# Patient Record
Sex: FEMALE | Race: WHITE | URBAN - NONMETROPOLITAN AREA
[De-identification: names, ages, dates, MRNs, and addresses within clinical notes are randomized per-mention and may not be internally consistent; named-entity substitution may affect disease eponyms.]

---

## 2017-01-05 RX ORDER — CLONAZEPAM 1 MG/1
1 TABLET ORAL 2 TIMES DAILY
Qty: 30 TABLET | Refills: 0 | Status: SHIPPED
Start: 2017-01-05 | End: 2017-01-24

## 2017-01-05 NOTE — TELEPHONE ENCOUNTER
Last OV: 6/1/2016  Last filled: 7/12/2016 #60 no RF    Pt would like refill request to go to covering provider

## 2017-01-05 NOTE — TELEPHONE ENCOUNTER
15 days supply   Re establish in office with Dr Tracee Fregoso to refill  Scrip printed and available  Please fax or call this script  Thanks! !  --jg

## 2017-01-05 NOTE — TELEPHONE ENCOUNTER
Clonazepam faxed to pharmacy. Sertraline filled for 1 year in 7/2016. Patient advised of this. Appointment made with Dr. Valeriano Garces on 1/27/17 at 1 pm.  Booked for 30 minute appointment. Routing to Dr. Valeriano Garces.  Please advise if this is an acceptable length f

## 2017-01-24 RX ORDER — CLONAZEPAM 1 MG/1
TABLET ORAL
Qty: 30 TABLET | Refills: 0 | OUTPATIENT
Start: 2017-01-24

## 2017-01-24 RX ORDER — CLONAZEPAM 1 MG/1
1 TABLET ORAL 2 TIMES DAILY
Qty: 30 TABLET | Refills: 0 | Status: SHIPPED
Start: 2017-01-24 | End: 2017-05-03

## 2017-01-24 NOTE — TELEPHONE ENCOUNTER
Last office visit 6-1-16  Last refill 1-5-17 #30  Future Appointments  Date Time Provider Saray Roque   1/25/2017 9:45 AM Daylin Garvey DO [facility]

## 2017-02-10 PROCEDURE — 84443 ASSAY THYROID STIM HORMONE: CPT | Performed by: FAMILY MEDICINE

## 2017-02-10 PROCEDURE — 85025 COMPLETE CBC W/AUTO DIFF WBC: CPT | Performed by: FAMILY MEDICINE

## 2017-02-10 PROCEDURE — 82607 VITAMIN B-12: CPT | Performed by: FAMILY MEDICINE

## 2017-02-10 PROCEDURE — 80053 COMPREHEN METABOLIC PANEL: CPT | Performed by: FAMILY MEDICINE

## 2017-02-10 PROCEDURE — 82306 VITAMIN D 25 HYDROXY: CPT | Performed by: FAMILY MEDICINE

## 2017-02-10 NOTE — PROGRESS NOTES
Lowell Flood is a 43year old female. HPI:   Daughter lives with boyfriend she goes to school as well. Got out of long term 1/4/2017 criminal property damage. Has lost 50 lb. Did not have any meds while in long term. Anxiety is off the wall.   Asks to go back on zo telengectasia to chest noted.   HEENT: ears and throat are clear  NECK: supple,no adenopathy  LUNGS: clear to auscultation  CARDIO: RRR without murmur  GI: good BS's,no masses, HSM or tenderness  EXTREMITIES: no cyanosis, clubbing or edema    ASSESSMENT AND

## 2017-02-10 NOTE — PATIENT INSTRUCTIONS
Recovering from Addiction: Continuing with Counseling  The road to recovery can be tough. But working with a counselor can help make your recovery smoother and keep you on track.  A counselor can help you decide which lifestyle changes you want to make to · [employer] on Alcoholism and Drug [address] [place] [address] [phone]  · National Drug and Alcohol Treatment Referral Service  162-345-LVZD ([phone])   Date Last Reviewed: 11/12/2014  © 9185-0381 The [address] [place] [address] Jhon Vuong © 8741-8849 [address], [address]. All rights reserved. This information is not intended as a substitute for professional medical care. Always follow your healthcare professional's instructions.         Your [employer] Anxiety can become a problem when it is difficult to control, occurs for months, and interferes with important parts of your life. With an anxiety disorder, your body has the response described above, but in inappropriate ways.  The response a person has de Clonazepam 1 mg bid  Labs done  Discussed addiction and pitfalls  Return for physical  congratulated on weight loss

## 2017-03-10 RX ORDER — CLONAZEPAM 1 MG/1
TABLET ORAL
Qty: 60 TABLET | Refills: 0 | OUTPATIENT
Start: 2017-03-10

## 2017-05-04 RX ORDER — CLONAZEPAM 1 MG/1
TABLET ORAL
Qty: 60 TABLET | Refills: 0 | OUTPATIENT
Start: 2017-05-04

## 2017-06-07 ENCOUNTER — PATIENT OUTREACH (OUTPATIENT)
Dept: FAMILY MEDICINE CLINIC | Facility: CLINIC | Age: 37
End: 2017-06-07

## 2017-06-29 DIAGNOSIS — F32.A ANXIETY AND DEPRESSION: ICD-10-CM

## 2017-06-29 DIAGNOSIS — F41.9 ANXIETY AND DEPRESSION: ICD-10-CM

## 2017-06-30 RX ORDER — CLONAZEPAM 1 MG/1
1 TABLET ORAL 2 TIMES DAILY PRN
Qty: 60 TABLET | Refills: 0 | Status: SHIPPED | OUTPATIENT
Start: 2017-06-30 | End: 2017-07-28

## 2017-07-28 DIAGNOSIS — F32.A ANXIETY AND DEPRESSION: ICD-10-CM

## 2017-07-28 DIAGNOSIS — F41.9 ANXIETY AND DEPRESSION: ICD-10-CM

## 2017-07-28 RX ORDER — CLONAZEPAM 1 MG/1
1 TABLET ORAL 2 TIMES DAILY PRN
Qty: 60 TABLET | Refills: 0 | Status: SHIPPED | OUTPATIENT
Start: 2017-07-28 | End: 2017-08-28

## 2017-08-22 ENCOUNTER — TELEPHONE (OUTPATIENT)
Dept: FAMILY MEDICINE CLINIC | Facility: CLINIC | Age: 37
End: 2017-08-22

## 2017-08-28 DIAGNOSIS — F32.A ANXIETY AND DEPRESSION: ICD-10-CM

## 2017-08-28 DIAGNOSIS — F41.9 ANXIETY AND DEPRESSION: ICD-10-CM

## 2017-08-29 ENCOUNTER — TELEPHONE (OUTPATIENT)
Dept: FAMILY MEDICINE CLINIC | Facility: CLINIC | Age: 37
End: 2017-08-29

## 2017-08-29 RX ORDER — CLONAZEPAM 1 MG/1
1 TABLET ORAL 2 TIMES DAILY PRN
Qty: 60 TABLET | Refills: 0 | Status: SHIPPED
Start: 2017-08-29 | End: 2017-09-26

## 2017-08-29 NOTE — TELEPHONE ENCOUNTER
SCRIPT FOR THIS PATIENT'S CLONAZEPAM WAS ALREADY SENT TO [facility] IN [place] BUT THEY ARE OUT OF STOCK AND STACY NEEDS VERBAL OK

## 2017-09-26 DIAGNOSIS — F41.9 ANXIETY AND DEPRESSION: ICD-10-CM

## 2017-09-26 DIAGNOSIS — F32.A ANXIETY AND DEPRESSION: ICD-10-CM

## 2017-09-26 RX ORDER — CLONAZEPAM 1 MG/1
1 TABLET ORAL 2 TIMES DAILY PRN
Qty: 60 TABLET | Refills: 0 | Status: SHIPPED | OUTPATIENT
Start: 2017-09-26 | End: 2017-10-25

## 2017-10-25 DIAGNOSIS — F32.A ANXIETY AND DEPRESSION: ICD-10-CM

## 2017-10-25 DIAGNOSIS — F41.9 ANXIETY AND DEPRESSION: ICD-10-CM

## 2017-10-25 RX ORDER — CLONAZEPAM 1 MG/1
TABLET ORAL
Qty: 60 TABLET | Refills: 0 | Status: SHIPPED
Start: 2017-10-25 | End: 2017-11-30

## 2017-10-25 RX ORDER — CLONAZEPAM 1 MG/1
1 TABLET ORAL 2 TIMES DAILY PRN
Qty: 60 TABLET | Refills: 0 | Status: CANCELLED | OUTPATIENT
Start: 2017-10-25

## 2017-10-25 RX ORDER — CLONAZEPAM 1 MG/1
TABLET ORAL
Qty: 60 TABLET | Refills: 0 | Status: CANCELLED | OUTPATIENT
Start: 2017-10-25

## 2017-10-25 RX ORDER — SERTRALINE HYDROCHLORIDE 100 MG/1
TABLET, FILM COATED ORAL
Qty: 30 TABLET | Refills: 0 | Status: SHIPPED | OUTPATIENT
Start: 2017-10-25 | End: 2017-12-06

## 2017-11-30 DIAGNOSIS — F41.9 ANXIETY AND DEPRESSION: ICD-10-CM

## 2017-11-30 DIAGNOSIS — F32.A ANXIETY AND DEPRESSION: ICD-10-CM

## 2017-11-30 RX ORDER — CLONAZEPAM 1 MG/1
TABLET ORAL
Qty: 10 TABLET | Refills: 0 | Status: SHIPPED | OUTPATIENT
Start: 2017-11-30 | End: 2017-12-06

## 2017-11-30 NOTE — TELEPHONE ENCOUNTER
Patient returned phone call and notified. appt booked.   Future Appointments  Date Time Provider Saray Roque   12/6/2017 2:30 PM Red Garvey DO [facility].

## 2017-11-30 NOTE — TELEPHONE ENCOUNTER
LOV- 2/10/17 - per that [address] patient was suppose to follow up in 1 month  Last refill- 10/25/2017 #60 with no refills  Patient states she is completely out of medication and asking for another provider to address.   Sent to covering provider, medication pendi

## 2017-11-30 NOTE — TELEPHONE ENCOUNTER
Left message on answering machine for patient to return phone call. Will need to notify that patient 10 pills were given, rx faxed to pharmacy.

## 2017-11-30 NOTE — TELEPHONE ENCOUNTER
REFILL CLONAZEPAM TO [facility], TOOK LAST PILL TODAY, CAN ANOTHER DR AUTHORIZE SINCE ENRRIQUE IS OUT OF OFFICE?

## 2017-12-06 ENCOUNTER — OFFICE VISIT (OUTPATIENT)
Dept: FAMILY MEDICINE CLINIC | Facility: CLINIC | Age: 37
End: 2017-12-06

## 2017-12-06 VITALS
HEIGHT: 65 IN | WEIGHT: 148 LBS | OXYGEN SATURATION: 99 % | DIASTOLIC BLOOD PRESSURE: 80 MMHG | TEMPERATURE: 99 F | HEART RATE: 78 BPM | SYSTOLIC BLOOD PRESSURE: 120 MMHG | BODY MASS INDEX: 24.66 KG/M2

## 2017-12-06 DIAGNOSIS — F10.21 ALCOHOLISM IN REMISSION (HCC): ICD-10-CM

## 2017-12-06 DIAGNOSIS — F32.A ANXIETY AND DEPRESSION: ICD-10-CM

## 2017-12-06 DIAGNOSIS — Z79.899 ENCOUNTER FOR DRUG THERAPY: Primary | ICD-10-CM

## 2017-12-06 DIAGNOSIS — F41.9 ANXIETY AND DEPRESSION: ICD-10-CM

## 2017-12-06 PROCEDURE — 99214 OFFICE O/P EST MOD 30 MIN: CPT | Performed by: FAMILY MEDICINE

## 2017-12-06 RX ORDER — CLONAZEPAM 1 MG/1
TABLET ORAL
Qty: 60 TABLET | Refills: 0 | Status: SHIPPED | OUTPATIENT
Start: 2017-12-06 | End: 2017-12-29

## 2017-12-06 RX ORDER — SERTRALINE HYDROCHLORIDE 100 MG/1
100 TABLET, FILM COATED ORAL
Qty: 90 TABLET | Refills: 1 | Status: SHIPPED | OUTPATIENT
Start: 2017-12-06 | End: 2018-06-06

## 2017-12-06 RX ORDER — QUETIAPINE 50 MG/1
50 TABLET, FILM COATED ORAL NIGHTLY
Qty: 30 TABLET | Refills: 0 | Status: SHIPPED | OUTPATIENT
Start: 2017-12-06 | End: 2018-06-06

## 2017-12-06 NOTE — PATIENT INSTRUCTIONS
Continue zoloft 100 mg - refilled for 6 months  Continue klonipin 1 mg bid refilled #30   Add seroquel 50mg  Watch for swelling of legs  Watch for excess voiding   Return 1 month for chem and CBC.  To monitor medication

## 2017-12-06 NOTE — PROGRESS NOTES
Neela Small is a 43year old female. HPI:   Here to discuss anxiety. Sleep is a struggle. Feels zoloft 100 mg is helping with anxiety and depression. Will occ have a beer. Has not gone to AA. Is  .  Working on where she will live with her Encounter for drug therapy  (primary encounter diagnosis)  Anxiety and depression  Alcoholism in remission (hcc)    No orders of the defined types were placed in this encounter.       Meds & Refills for this Visit:  Signed Prescriptions Disp Refills    QU

## 2017-12-29 DIAGNOSIS — Z79.899 ENCOUNTER FOR DRUG THERAPY: ICD-10-CM

## 2017-12-29 DIAGNOSIS — F32.A ANXIETY AND DEPRESSION: ICD-10-CM

## 2017-12-29 DIAGNOSIS — F41.9 ANXIETY AND DEPRESSION: ICD-10-CM

## 2017-12-29 RX ORDER — CLONAZEPAM 1 MG/1
TABLET ORAL
Qty: 60 TABLET | Refills: 0 | Status: SHIPPED | OUTPATIENT
Start: 2017-12-29 | End: 2018-01-25

## 2018-01-25 ENCOUNTER — TELEPHONE (OUTPATIENT)
Dept: FAMILY MEDICINE CLINIC | Facility: CLINIC | Age: 38
End: 2018-01-25

## 2018-01-25 DIAGNOSIS — F41.9 ANXIETY AND DEPRESSION: ICD-10-CM

## 2018-01-25 DIAGNOSIS — Z79.899 ENCOUNTER FOR DRUG THERAPY: ICD-10-CM

## 2018-01-25 DIAGNOSIS — F32.A ANXIETY AND DEPRESSION: ICD-10-CM

## 2018-01-25 RX ORDER — CLONAZEPAM 1 MG/1
TABLET ORAL
Qty: 60 TABLET | Refills: 0 | Status: CANCELLED | OUTPATIENT
Start: 2018-01-25

## 2018-01-26 RX ORDER — CLONAZEPAM 1 MG/1
TABLET ORAL
Qty: 60 TABLET | Refills: 0 | Status: SHIPPED | OUTPATIENT
Start: 2018-01-26 | End: 2018-02-28

## 2018-01-31 ENCOUNTER — TELEPHONE (OUTPATIENT)
Dept: FAMILY MEDICINE CLINIC | Facility: CLINIC | Age: 38
End: 2018-01-31

## 2018-01-31 NOTE — TELEPHONE ENCOUNTER
Patient states that she has been moving from house to house. She has lived in 4 houses. Is unable to remember where she left the bottle. Advised that medication is a controlled substance which is only able to be filled once per month.   Patient notified

## 2018-02-28 ENCOUNTER — TELEPHONE (OUTPATIENT)
Dept: FAMILY MEDICINE CLINIC | Facility: CLINIC | Age: 38
End: 2018-02-28

## 2018-02-28 DIAGNOSIS — F32.A ANXIETY AND DEPRESSION: ICD-10-CM

## 2018-02-28 DIAGNOSIS — Z79.899 ENCOUNTER FOR DRUG THERAPY: ICD-10-CM

## 2018-02-28 DIAGNOSIS — F41.9 ANXIETY AND DEPRESSION: ICD-10-CM

## 2018-02-28 RX ORDER — CLONAZEPAM 1 MG/1
TABLET ORAL
Qty: 60 TABLET | Refills: 0 | Status: SHIPPED | OUTPATIENT
Start: 2018-02-28 | End: 2018-03-29

## 2018-03-29 DIAGNOSIS — Z79.899 ENCOUNTER FOR DRUG THERAPY: ICD-10-CM

## 2018-03-29 DIAGNOSIS — F41.9 ANXIETY AND DEPRESSION: ICD-10-CM

## 2018-03-29 DIAGNOSIS — F32.A ANXIETY AND DEPRESSION: ICD-10-CM

## 2018-03-30 RX ORDER — CLONAZEPAM 1 MG/1
TABLET ORAL
Qty: 60 TABLET | Refills: 0 | Status: SHIPPED
Start: 2018-03-30 | End: 2018-05-01

## 2018-04-26 ENCOUNTER — TELEPHONE (OUTPATIENT)
Dept: FAMILY MEDICINE CLINIC | Facility: CLINIC | Age: 38
End: 2018-04-26

## 2018-04-26 NOTE — TELEPHONE ENCOUNTER
Last refilled on 3/30/18 for # 60 with 0 rf. Last seen on 12/6/17. No future appointments. Thank you.

## 2018-04-30 DIAGNOSIS — F41.9 ANXIETY AND DEPRESSION: ICD-10-CM

## 2018-04-30 DIAGNOSIS — Z79.899 ENCOUNTER FOR DRUG THERAPY: ICD-10-CM

## 2018-04-30 DIAGNOSIS — F32.A ANXIETY AND DEPRESSION: ICD-10-CM

## 2018-04-30 RX ORDER — CLONAZEPAM 1 MG/1
TABLET ORAL
Qty: 60 TABLET | Refills: 0 | Status: CANCELLED | OUTPATIENT
Start: 2018-04-30

## 2018-05-01 ENCOUNTER — TELEPHONE (OUTPATIENT)
Dept: FAMILY MEDICINE CLINIC | Facility: CLINIC | Age: 38
End: 2018-05-01

## 2018-05-01 DIAGNOSIS — F41.9 ANXIETY AND DEPRESSION: ICD-10-CM

## 2018-05-01 DIAGNOSIS — Z79.899 ENCOUNTER FOR DRUG THERAPY: ICD-10-CM

## 2018-05-01 DIAGNOSIS — F32.A ANXIETY AND DEPRESSION: ICD-10-CM

## 2018-05-01 RX ORDER — CLONAZEPAM 1 MG/1
TABLET ORAL
Qty: 60 TABLET | Refills: 0 | Status: SHIPPED | OUTPATIENT
Start: 2018-05-01 | End: 2018-06-06

## 2018-05-01 NOTE — TELEPHONE ENCOUNTER
CLONAZEPAM   [facility]      SHE SAID THAT SHE ASKED FOR THIS REFILL LAST WEEK AND IT HAS NOT BEEN REFILLED YET

## 2018-05-01 NOTE — TELEPHONE ENCOUNTER
Alana Champion needs to follow up next month for 6 month check.  She will get her next refill with her appointment

## 2018-05-23 ENCOUNTER — TELEPHONE (OUTPATIENT)
Dept: FAMILY MEDICINE CLINIC | Facility: CLINIC | Age: 38
End: 2018-05-23

## 2018-05-31 DIAGNOSIS — Z79.899 ENCOUNTER FOR DRUG THERAPY: ICD-10-CM

## 2018-05-31 DIAGNOSIS — F41.9 ANXIETY AND DEPRESSION: ICD-10-CM

## 2018-05-31 DIAGNOSIS — F32.A ANXIETY AND DEPRESSION: ICD-10-CM

## 2018-05-31 NOTE — TELEPHONE ENCOUNTER
Last office visit:  12/06/2017    Last refill:  5/01/2018    Pending Prescriptions Disp Refills    CLONAZEPAM 1 MG Oral Tab [Pharmacy Med Name: CLONAZEPAM 1MG TABLETS] 60 tablet 0     Sig: TAKE 1 TABLET BY MOUTH TWICE DAILY AS NEEDED FOR ANXIETY         No future appointments.

## 2018-06-01 RX ORDER — CLONAZEPAM 1 MG/1
TABLET ORAL
Qty: 60 TABLET | Refills: 0 | OUTPATIENT
Start: 2018-06-01

## 2018-06-06 ENCOUNTER — OFFICE VISIT (OUTPATIENT)
Dept: FAMILY MEDICINE CLINIC | Facility: CLINIC | Age: 38
End: 2018-06-06

## 2018-06-06 VITALS
WEIGHT: 153 LBS | TEMPERATURE: 98 F | SYSTOLIC BLOOD PRESSURE: 118 MMHG | HEIGHT: 64 IN | HEART RATE: 88 BPM | DIASTOLIC BLOOD PRESSURE: 80 MMHG | BODY MASS INDEX: 26.12 KG/M2 | RESPIRATION RATE: 12 BRPM

## 2018-06-06 DIAGNOSIS — Z79.899 ENCOUNTER FOR DRUG THERAPY: ICD-10-CM

## 2018-06-06 DIAGNOSIS — J30.89 SEASONAL ALLERGIC RHINITIS DUE TO OTHER ALLERGIC TRIGGER: ICD-10-CM

## 2018-06-06 DIAGNOSIS — Z51.81 ENCOUNTER FOR THERAPEUTIC DRUG MONITORING: Primary | ICD-10-CM

## 2018-06-06 DIAGNOSIS — F41.9 ANXIETY AND DEPRESSION: ICD-10-CM

## 2018-06-06 DIAGNOSIS — L23.7 POISON IVY DERMATITIS: ICD-10-CM

## 2018-06-06 DIAGNOSIS — F32.A ANXIETY AND DEPRESSION: ICD-10-CM

## 2018-06-06 PROBLEM — J30.9 ALLERGIC RHINITIS DUE TO ALLERGEN: Status: ACTIVE | Noted: 2018-06-06

## 2018-06-06 PROCEDURE — 99212 OFFICE O/P EST SF 10 MIN: CPT | Performed by: FAMILY MEDICINE

## 2018-06-06 RX ORDER — QUETIAPINE 25 MG/1
25 TABLET, FILM COATED ORAL NIGHTLY
Qty: 30 TABLET | Refills: 0 | Status: SHIPPED | OUTPATIENT
Start: 2018-06-06 | End: 2018-08-10

## 2018-06-06 RX ORDER — SERTRALINE HYDROCHLORIDE 100 MG/1
100 TABLET, FILM COATED ORAL
Qty: 90 TABLET | Refills: 1 | Status: SHIPPED | OUTPATIENT
Start: 2018-06-06 | End: 2019-02-22

## 2018-06-06 RX ORDER — PREDNISONE 20 MG/1
20 TABLET ORAL 2 TIMES DAILY
Qty: 10 TABLET | Refills: 0 | Status: SHIPPED | OUTPATIENT
Start: 2018-06-06 | End: 2018-06-11

## 2018-06-06 RX ORDER — CLONAZEPAM 1 MG/1
TABLET ORAL
Qty: 60 TABLET | Refills: 0 | Status: SHIPPED | OUTPATIENT
Start: 2018-06-06 | End: 2018-06-27

## 2018-06-06 NOTE — PROGRESS NOTES
Gerry Acosta is a 42year old female. HPI:   Here for med refill.  her . Anxiety is high. DAUGHTER GETTING  IN 2 WEEKS. SHE IS NOT WORKING. DRINKS SOME. FRIEND GAVE HER XANAX TO GET HER THROUGH UNTIL THIS APPT.  NO INSURANCE    Curre AND PLAN:   1. Encounter for therapeutic drug monitoring  - stressed importance of follow up every 6 months for medication monitoring    2.  Anxiety and depression  - zoloft 100 mg  - seroquel 25 mg nightly  - klonazepam 1 mg bid prn will refill for 6 month

## 2018-06-06 NOTE — PATIENT INSTRUCTIONS
Treating Anxiety Disorders with Medicine  An anxiety disorder can make you feel nervous or apprehensive, even without a clear reason.  In people age 72 and older, generalized anxiety disorder is one of the most commonly diagnosed anxiety disorders. Many t Never use alcohol or other drugs with anti-anxiety medicines. This could result in loss of muscular control, sedation, coma, or death. Also, use only the amount of medicine prescribed for you.  If you think you may have taken too much, get emergency care ri · Sexual problems. Some antidepressants can affect your desire for sex or your ability to have an orgasm. A change in dosage or medicine often solves the problem. If you have a sexual side effect that concerns you, tell your healthcare provider.   · Addicti

## 2018-06-27 ENCOUNTER — TELEPHONE (OUTPATIENT)
Dept: FAMILY MEDICINE CLINIC | Facility: CLINIC | Age: 38
End: 2018-06-27

## 2018-06-27 DIAGNOSIS — F41.9 ANXIETY AND DEPRESSION: ICD-10-CM

## 2018-06-27 DIAGNOSIS — Z79.899 ENCOUNTER FOR DRUG THERAPY: ICD-10-CM

## 2018-06-27 DIAGNOSIS — F32.A ANXIETY AND DEPRESSION: ICD-10-CM

## 2018-06-27 RX ORDER — CLONAZEPAM 1 MG/1
TABLET ORAL
Qty: 60 TABLET | Refills: 0 | Status: SHIPPED
Start: 2018-06-27 | End: 2018-06-27 | Stop reason: CLARIF

## 2018-06-27 NOTE — TELEPHONE ENCOUNTER
Detailed message left for patient notifying him that Clonzepam refill is denied. Medication should last for one month per Dr. Arpit [place]. Patient should have enough until 7/6/18.

## 2018-07-05 DIAGNOSIS — F32.A ANXIETY AND DEPRESSION: ICD-10-CM

## 2018-07-05 DIAGNOSIS — Z79.899 ENCOUNTER FOR DRUG THERAPY: ICD-10-CM

## 2018-07-05 DIAGNOSIS — F41.9 ANXIETY AND DEPRESSION: ICD-10-CM

## 2018-07-05 RX ORDER — CLONAZEPAM 1 MG/1
TABLET ORAL
Qty: 60 TABLET | Refills: 0 | Status: SHIPPED
Start: 2018-07-05 | End: 2018-08-01

## 2018-07-05 NOTE — TELEPHONE ENCOUNTER
Per office note 6/27, should last one month. Should have enough until 7/6/18. Patient states she is out. OK to fill today?

## 2018-07-05 NOTE — TELEPHONE ENCOUNTER
Ov 6/6/2018  Clonazepam last filled 6/6/2018    Vitamin D was due 5/8/2017? ?   Vitamin B12 was due 8/13/2017  Please advise

## 2018-07-05 NOTE — TELEPHONE ENCOUNTER
Pt. Bernardo Gaby to go get her CLONAZEPAM 1 MG Oral Tab   And it was not ready? She said she needs it today.

## 2018-07-13 ENCOUNTER — TELEPHONE (OUTPATIENT)
Dept: FAMILY MEDICINE CLINIC | Facility: CLINIC | Age: 38
End: 2018-07-13

## 2018-07-13 DIAGNOSIS — Z79.899 ENCOUNTER FOR DRUG THERAPY: ICD-10-CM

## 2018-07-13 DIAGNOSIS — F41.9 ANXIETY AND DEPRESSION: ICD-10-CM

## 2018-07-13 DIAGNOSIS — F32.A ANXIETY AND DEPRESSION: ICD-10-CM

## 2018-07-13 RX ORDER — PREDNISONE 20 MG/1
20 TABLET ORAL 2 TIMES DAILY
Qty: 10 TABLET | Refills: 0 | Status: SHIPPED | OUTPATIENT
Start: 2018-07-13 | End: 2018-07-18

## 2018-07-13 RX ORDER — TRIAMCINOLONE ACETONIDE 5 MG/G
CREAM TOPICAL
Qty: 60 G | Refills: 0 | Status: SHIPPED | OUTPATIENT
Start: 2018-07-13 | End: 2019-02-22

## 2018-07-13 NOTE — TELEPHONE ENCOUNTER
Patient states that she has poison ivy on her arm, neck and face. Per aidan Christensen to send in script for prednisone 20mg BID x 5 days and triamcinalone cream BID. If not better, patient should follow up. Patient verbalized understanding.

## 2018-08-01 DIAGNOSIS — Z79.899 ENCOUNTER FOR DRUG THERAPY: ICD-10-CM

## 2018-08-01 DIAGNOSIS — F41.9 ANXIETY AND DEPRESSION: ICD-10-CM

## 2018-08-01 DIAGNOSIS — F32.A ANXIETY AND DEPRESSION: ICD-10-CM

## 2018-08-01 RX ORDER — CLONAZEPAM 1 MG/1
TABLET ORAL
Qty: 60 TABLET | Refills: 0 | Status: SHIPPED
Start: 2018-08-01 | End: 2018-08-28

## 2018-08-02 ENCOUNTER — TELEPHONE (OUTPATIENT)
Dept: FAMILY MEDICINE CLINIC | Facility: CLINIC | Age: 38
End: 2018-08-02

## 2018-08-02 NOTE — TELEPHONE ENCOUNTER
CLONAZEPAM TO [facility] [address] [facility] IN [place]. CANNOT GET TO [facility] IN [place] TODAY.

## 2018-08-02 NOTE — TELEPHONE ENCOUNTER
Phone call to [employer] [facility]. Rx canceled with [facility].   Rx faxed to [facility] [address] per patient's request.

## 2018-08-10 DIAGNOSIS — F41.9 ANXIETY AND DEPRESSION: ICD-10-CM

## 2018-08-10 DIAGNOSIS — Z51.81 ENCOUNTER FOR THERAPEUTIC DRUG MONITORING: ICD-10-CM

## 2018-08-10 DIAGNOSIS — F32.A ANXIETY AND DEPRESSION: ICD-10-CM

## 2018-08-10 RX ORDER — QUETIAPINE 25 MG/1
25 TABLET, FILM COATED ORAL NIGHTLY
Qty: 30 TABLET | Refills: 0 | Status: SHIPPED | OUTPATIENT
Start: 2018-08-10 | End: 2018-09-08

## 2018-08-10 NOTE — TELEPHONE ENCOUNTER
REFILL SERAQUIL  TO [facility] [address] [facility] [address] IN [place]    IS OUT OF MEDICATION.

## 2018-08-11 ENCOUNTER — TELEPHONE (OUTPATIENT)
Dept: FAMILY MEDICINE CLINIC | Facility: CLINIC | Age: 38
End: 2018-08-11

## 2018-08-11 RX ORDER — QUETIAPINE 25 MG/1
TABLET, FILM COATED ORAL
Qty: 30 TABLET | Refills: 0 | OUTPATIENT
Start: 2018-08-11

## 2018-08-11 NOTE — TELEPHONE ENCOUNTER
Medication sent to [facility] in [facility]. I called the [facility] in [place] and spoke to Roseanne Morris and she will fill the medication.    I called the pt and NA and voicemail is not set up. Judy Tee

## 2018-08-28 DIAGNOSIS — F32.A ANXIETY AND DEPRESSION: ICD-10-CM

## 2018-08-28 DIAGNOSIS — Z79.899 ENCOUNTER FOR DRUG THERAPY: ICD-10-CM

## 2018-08-28 DIAGNOSIS — F41.9 ANXIETY AND DEPRESSION: ICD-10-CM

## 2018-08-28 RX ORDER — CLONAZEPAM 1 MG/1
TABLET ORAL
Qty: 60 TABLET | Refills: 0 | Status: SHIPPED | OUTPATIENT
Start: 2018-08-28 | End: 2018-09-28

## 2018-09-07 ENCOUNTER — TELEPHONE (OUTPATIENT)
Dept: FAMILY MEDICINE CLINIC | Facility: CLINIC | Age: 38
End: 2018-09-07

## 2018-09-07 NOTE — TELEPHONE ENCOUNTER
PT WOULD LIKE TO TALK WITH NURSE RE: TAKING A NEW MEDICATION, GABAPENTIN . PT WOULD LIKE TO KNOW IF SHE DR CAN PRESCRIBE THIS OR DOES SHE NEED TO SEE DR Parrish Santiago FIRST?  PLEASE CALL

## 2018-09-07 NOTE — TELEPHONE ENCOUNTER
PATIENT ADVISED DR. Kirk Higgins WOULD NEED TO SEE HER PRIOR TO PRESCRIBING A MEDICATION LIKE GABAPENTIN. OFFERED APPT TODAY AND IS UNABLE TO COME IN, ATTEMPTED TO SCHEDULE NEXT WEEK BUT STATES SHE WILL CALL BACK DUE TO TRANSPORTATION ISSUES.

## 2018-09-08 DIAGNOSIS — F32.A ANXIETY AND DEPRESSION: ICD-10-CM

## 2018-09-08 DIAGNOSIS — Z51.81 ENCOUNTER FOR THERAPEUTIC DRUG MONITORING: ICD-10-CM

## 2018-09-08 DIAGNOSIS — F41.9 ANXIETY AND DEPRESSION: ICD-10-CM

## 2018-09-08 RX ORDER — QUETIAPINE 25 MG/1
TABLET, FILM COATED ORAL
Qty: 30 TABLET | Refills: 0 | Status: SHIPPED | OUTPATIENT
Start: 2018-09-08 | End: 2018-10-11

## 2018-09-28 DIAGNOSIS — Z79.899 ENCOUNTER FOR DRUG THERAPY: ICD-10-CM

## 2018-09-28 DIAGNOSIS — F32.A ANXIETY AND DEPRESSION: ICD-10-CM

## 2018-09-28 DIAGNOSIS — F41.9 ANXIETY AND DEPRESSION: ICD-10-CM

## 2018-09-28 RX ORDER — CLONAZEPAM 1 MG/1
TABLET ORAL
Qty: 60 TABLET | Refills: 0 | Status: SHIPPED | OUTPATIENT
Start: 2018-09-28 | End: 2018-10-30

## 2018-09-28 NOTE — TELEPHONE ENCOUNTER
Last rf 8/28/18 #60x0  Last ov 6/6/18    Future Appointments   Date Time Provider Saray Roque   10/2/2018 11:00 AM Gary Garvey, DO [facility]

## 2018-10-02 ENCOUNTER — PATIENT OUTREACH (OUTPATIENT)
Dept: FAMILY MEDICINE CLINIC | Facility: CLINIC | Age: 38
End: 2018-10-02

## 2018-10-11 DIAGNOSIS — F32.A ANXIETY AND DEPRESSION: ICD-10-CM

## 2018-10-11 DIAGNOSIS — F41.9 ANXIETY AND DEPRESSION: ICD-10-CM

## 2018-10-11 DIAGNOSIS — Z51.81 ENCOUNTER FOR THERAPEUTIC DRUG MONITORING: ICD-10-CM

## 2018-10-11 RX ORDER — QUETIAPINE 25 MG/1
TABLET, FILM COATED ORAL
Qty: 30 TABLET | Refills: 0 | Status: SHIPPED | OUTPATIENT
Start: 2018-10-11 | End: 2018-11-10

## 2018-10-30 DIAGNOSIS — Z79.899 ENCOUNTER FOR DRUG THERAPY: ICD-10-CM

## 2018-10-30 DIAGNOSIS — F41.9 ANXIETY AND DEPRESSION: ICD-10-CM

## 2018-10-30 DIAGNOSIS — F32.A ANXIETY AND DEPRESSION: ICD-10-CM

## 2018-10-30 RX ORDER — CLONAZEPAM 1 MG/1
TABLET ORAL
Qty: 60 TABLET | Refills: 0 | Status: SHIPPED | OUTPATIENT
Start: 2018-10-30 | End: 2018-11-30

## 2018-11-10 DIAGNOSIS — F41.9 ANXIETY AND DEPRESSION: ICD-10-CM

## 2018-11-10 DIAGNOSIS — F32.A ANXIETY AND DEPRESSION: ICD-10-CM

## 2018-11-10 DIAGNOSIS — Z51.81 ENCOUNTER FOR THERAPEUTIC DRUG MONITORING: ICD-10-CM

## 2018-11-12 RX ORDER — QUETIAPINE 25 MG/1
TABLET, FILM COATED ORAL
Qty: 30 TABLET | Refills: 0 | Status: SHIPPED | OUTPATIENT
Start: 2018-11-12 | End: 2018-12-11

## 2018-11-29 DIAGNOSIS — F32.A ANXIETY AND DEPRESSION: ICD-10-CM

## 2018-11-29 DIAGNOSIS — Z79.899 ENCOUNTER FOR DRUG THERAPY: ICD-10-CM

## 2018-11-29 DIAGNOSIS — F41.9 ANXIETY AND DEPRESSION: ICD-10-CM

## 2018-11-29 NOTE — TELEPHONE ENCOUNTER
LOV- 6/6/2018  Last refill- 10/30/18 #60 with no refills. Medication pending in encounter. No future appointments.

## 2018-11-30 RX ORDER — CLONAZEPAM 1 MG/1
TABLET ORAL
Qty: 60 TABLET | Refills: 0 | Status: SHIPPED | OUTPATIENT
Start: 2018-11-30 | End: 2018-12-28

## 2018-12-11 DIAGNOSIS — F41.9 ANXIETY AND DEPRESSION: ICD-10-CM

## 2018-12-11 DIAGNOSIS — Z51.81 ENCOUNTER FOR THERAPEUTIC DRUG MONITORING: ICD-10-CM

## 2018-12-11 DIAGNOSIS — F32.A ANXIETY AND DEPRESSION: ICD-10-CM

## 2018-12-12 RX ORDER — QUETIAPINE 25 MG/1
TABLET, FILM COATED ORAL
Qty: 30 TABLET | Refills: 0 | Status: SHIPPED | OUTPATIENT
Start: 2018-12-12 | End: 2019-01-17

## 2018-12-28 DIAGNOSIS — F41.9 ANXIETY AND DEPRESSION: ICD-10-CM

## 2018-12-28 DIAGNOSIS — Z79.899 ENCOUNTER FOR DRUG THERAPY: ICD-10-CM

## 2018-12-28 DIAGNOSIS — F32.A ANXIETY AND DEPRESSION: ICD-10-CM

## 2018-12-28 RX ORDER — CLONAZEPAM 1 MG/1
TABLET ORAL
Qty: 60 TABLET | Refills: 0 | Status: SHIPPED | OUTPATIENT
Start: 2018-12-28 | End: 2018-12-31

## 2019-01-17 DIAGNOSIS — F32.A ANXIETY AND DEPRESSION: ICD-10-CM

## 2019-01-17 DIAGNOSIS — Z51.81 ENCOUNTER FOR THERAPEUTIC DRUG MONITORING: ICD-10-CM

## 2019-01-17 DIAGNOSIS — F41.9 ANXIETY AND DEPRESSION: ICD-10-CM

## 2019-01-17 RX ORDER — QUETIAPINE 25 MG/1
TABLET, FILM COATED ORAL
Qty: 30 TABLET | Refills: 0 | Status: SHIPPED | OUTPATIENT
Start: 2019-01-17 | End: 2019-02-22

## 2019-01-28 ENCOUNTER — TELEPHONE (OUTPATIENT)
Dept: FAMILY MEDICINE CLINIC | Facility: CLINIC | Age: 39
End: 2019-01-28

## 2019-01-28 DIAGNOSIS — Z79.899 ENCOUNTER FOR DRUG THERAPY: ICD-10-CM

## 2019-01-28 DIAGNOSIS — F32.A ANXIETY AND DEPRESSION: ICD-10-CM

## 2019-01-28 DIAGNOSIS — F41.9 ANXIETY AND DEPRESSION: ICD-10-CM

## 2019-01-28 RX ORDER — CLONAZEPAM 1 MG/1
TABLET ORAL
Qty: 60 TABLET | Refills: 0 | Status: SHIPPED | OUTPATIENT
Start: 2019-01-28 | End: 2019-03-02

## 2019-02-14 ENCOUNTER — TELEPHONE (OUTPATIENT)
Dept: FAMILY MEDICINE CLINIC | Facility: CLINIC | Age: 39
End: 2019-02-14

## 2019-02-14 DIAGNOSIS — E53.8 VITAMIN B 12 DEFICIENCY: Primary | ICD-10-CM

## 2019-02-14 DIAGNOSIS — E55.9 VITAMIN D DEFICIENCY: ICD-10-CM

## 2019-02-14 NOTE — TELEPHONE ENCOUNTER
Patient is asking to go ahead and start with B-12 injections. She is asking if she needs another appointment with Dr Dana Jones or if she can just schedule appt? She has not been seen since June 2018.    Please call patient (Friday is ok)

## 2019-02-14 NOTE — TELEPHONE ENCOUNTER
Last OV 6/6/18 was advised to return in 6 months  Last labs 2/10/17 when the B-12 injections were first mentioned. No future appointments scheduled. Aware Dr. Dana Jones will address tomorrow.

## 2019-02-15 NOTE — TELEPHONE ENCOUNTER
Fanny Sapp needs to be seen and have B12 level rechecked. We can have labs done before her visit and then start injections if she still needs them.

## 2019-02-15 NOTE — TELEPHONE ENCOUNTER
Pt aware and v/u.  She made an appt. jmw    Future Appointments   Date Time Provider [facility]   2/18/2019  9:00 AM REF [facility] REF [facility] Ref Lab [place]   2/22/2019  1:45 PM Miley Garvey, DO EM[facility]

## 2019-02-18 ENCOUNTER — APPOINTMENT (OUTPATIENT)
Dept: LAB | Age: 39
End: 2019-02-18
Attending: FAMILY MEDICINE

## 2019-02-18 DIAGNOSIS — E53.8 VITAMIN B 12 DEFICIENCY: ICD-10-CM

## 2019-02-18 DIAGNOSIS — E55.9 VITAMIN D DEFICIENCY: ICD-10-CM

## 2019-02-18 LAB — VIT B12 SERPL-MCNC: 301 PG/ML (ref 193–986)

## 2019-02-18 PROCEDURE — 82607 VITAMIN B-12: CPT | Performed by: FAMILY MEDICINE

## 2019-02-20 DIAGNOSIS — E53.8 VITAMIN B 12 DEFICIENCY: Primary | ICD-10-CM

## 2019-02-20 RX ORDER — CYANOCOBALAMIN 1000 UG/ML
1000 INJECTION INTRAMUSCULAR; SUBCUTANEOUS
Status: SHIPPED | OUTPATIENT
Start: 2019-02-22 | End: 2019-08-21

## 2019-02-22 ENCOUNTER — OFFICE VISIT (OUTPATIENT)
Dept: FAMILY MEDICINE CLINIC | Facility: CLINIC | Age: 39
End: 2019-02-22

## 2019-02-22 VITALS
OXYGEN SATURATION: 97 % | WEIGHT: 161.38 LBS | BODY MASS INDEX: 27.55 KG/M2 | HEIGHT: 64 IN | TEMPERATURE: 100 F | SYSTOLIC BLOOD PRESSURE: 114 MMHG | HEART RATE: 62 BPM | DIASTOLIC BLOOD PRESSURE: 70 MMHG

## 2019-02-22 DIAGNOSIS — F32.A ANXIETY AND DEPRESSION: ICD-10-CM

## 2019-02-22 DIAGNOSIS — F41.9 ANXIETY AND DEPRESSION: ICD-10-CM

## 2019-02-22 DIAGNOSIS — E53.8 VITAMIN B 12 DEFICIENCY: ICD-10-CM

## 2019-02-22 DIAGNOSIS — Z12.31 VISIT FOR SCREENING MAMMOGRAM: ICD-10-CM

## 2019-02-22 DIAGNOSIS — Z51.81 ENCOUNTER FOR THERAPEUTIC DRUG MONITORING: ICD-10-CM

## 2019-02-22 DIAGNOSIS — Z79.899 ENCOUNTER FOR DRUG THERAPY: Primary | ICD-10-CM

## 2019-02-22 RX ORDER — CYANOCOBALAMIN 1000 UG/ML
1000 INJECTION INTRAMUSCULAR; SUBCUTANEOUS ONCE
Status: DISCONTINUED | OUTPATIENT
Start: 2019-02-22 | End: 2019-02-22

## 2019-02-22 RX ORDER — QUETIAPINE 25 MG/1
TABLET, FILM COATED ORAL
Qty: 30 TABLET | Refills: 0 | Status: SHIPPED | OUTPATIENT
Start: 2019-02-22 | End: 2019-03-01

## 2019-02-22 NOTE — PROGRESS NOTES
Abdiel Guzman is a 34year old female. HPI:   Recently . Son is with his father. Son has not been is school. Son will be 13years old. Doing well with current medications. Needs refill seroquel. Periods a month  For 3 months is on illini care.  Irma Mercado drug therapy  (primary encounter diagnosis)  Visit for screening mammogram  Vitamin b 12 deficiency  Vitamin d deficiency  Anxiety and depression    No orders of the defined types were placed in this encounter.       Meds & Refills for this Visit:  Requeste

## 2019-02-22 NOTE — PATIENT INSTRUCTIONS
Vitamin B-12  Does this test have other names? VB12, serum cobalamin  What is this test?  This test measures the level of vitamin B-12 in your blood. You need this vitamin to make red blood cells and for your nervous system to function as it should.   Jorgito Gamez What do my test results mean? Test results may vary depending on your age, gender, health history, the method used for the test, and other things. Your test results may not mean you have a problem.  Ask your healthcare provider what your test results mean You must fast before this test. You may be able to drink water, but you should not eat or drink anything else after midnight on the night before the test. If you are not having the test in the morning, ask your healthcare provider how long you need to fast

## 2019-03-01 ENCOUNTER — TELEPHONE (OUTPATIENT)
Dept: FAMILY MEDICINE CLINIC | Facility: CLINIC | Age: 39
End: 2019-03-01

## 2019-03-01 DIAGNOSIS — F41.9 ANXIETY AND DEPRESSION: ICD-10-CM

## 2019-03-01 DIAGNOSIS — Z51.81 ENCOUNTER FOR THERAPEUTIC DRUG MONITORING: ICD-10-CM

## 2019-03-01 DIAGNOSIS — F32.A ANXIETY AND DEPRESSION: ICD-10-CM

## 2019-03-01 DIAGNOSIS — Z79.899 ENCOUNTER FOR DRUG THERAPY: ICD-10-CM

## 2019-03-01 RX ORDER — QUETIAPINE 25 MG/1
TABLET, FILM COATED ORAL
Qty: 30 TABLET | Refills: 0 | Status: SHIPPED | OUTPATIENT
Start: 2019-03-01 | End: 2020-02-10

## 2019-03-01 NOTE — TELEPHONE ENCOUNTER
Dr. Karren Halsted sent the wrong medication to [facility] in [place]. The medication sent was Quetiapine and was suppose to be Clonazepam. Dr Karren Halsted is out of the office until 3/5/19.    Dr. Morgan Thakkar would you be able to fill this medication? casa

## 2019-03-01 NOTE — TELEPHONE ENCOUNTER
Prescription was called to the [facility] by mistake.   Please call to [facility]

## 2019-03-02 RX ORDER — CLONAZEPAM 1 MG/1
TABLET ORAL
Qty: 60 TABLET | Refills: 0 | Status: SHIPPED | OUTPATIENT
Start: 2019-03-02 | End: 2019-04-02

## 2019-03-05 ENCOUNTER — TELEPHONE (OUTPATIENT)
Dept: FAMILY MEDICINE CLINIC | Facility: CLINIC | Age: 39
End: 2019-03-05

## 2019-03-05 RX ORDER — METRONIDAZOLE 500 MG/1
500 TABLET ORAL 3 TIMES DAILY
Qty: 21 TABLET | Refills: 0 | Status: SHIPPED | OUTPATIENT
Start: 2019-03-05 | End: 2020-02-10 | Stop reason: ALTCHOICE

## 2019-03-05 NOTE — TELEPHONE ENCOUNTER
LOGAN WOULD LIKE TO SPEAK TO THE NURSE, SHE SAID SHE IS HAVING \"FEMALE PROBLEMS\" SHE WOULD NOT GO INTO DETAIL ABOUT IT

## 2019-03-05 NOTE — TELEPHONE ENCOUNTER
Spoke with Dr. Kaya Hand, she states okay to send over Flagyl to the Pharmacy. Will need to remind patient that with her insurance she will need to follow up with new pcp, but Dr. Kaya Hand will send medication over for this one time.  Further medications will ne

## 2019-03-05 NOTE — TELEPHONE ENCOUNTER
Called and spoke to patient she states when she was seen last week,she discussed with you regarding vaginal issues, she states she does have some vaginal discharge, but is more concerned with the odor,she was just informed that her partner was positive for

## 2019-04-01 DIAGNOSIS — Z79.899 ENCOUNTER FOR DRUG THERAPY: ICD-10-CM

## 2019-04-01 DIAGNOSIS — F41.9 ANXIETY AND DEPRESSION: ICD-10-CM

## 2019-04-01 DIAGNOSIS — F32.A ANXIETY AND DEPRESSION: ICD-10-CM

## 2019-04-01 NOTE — TELEPHONE ENCOUNTER
Last office visit: 2/22/2019  Last refill for Clonazepam: 3/02/2019  Requested Prescriptions     Pending Prescriptions Disp Refills   • clonazePAM 1 MG Oral Tab 60 tablet 0     Sig: TAKE 1 TABLET BY MOUTH TWICE DAILY AS NEEDED FOR ANXIETY     No future nestor

## 2019-04-02 RX ORDER — CLONAZEPAM 1 MG/1
TABLET ORAL
Qty: 60 TABLET | Refills: 0 | Status: SHIPPED | OUTPATIENT
Start: 2019-04-02 | End: 2019-05-02

## 2019-05-02 DIAGNOSIS — F32.A ANXIETY AND DEPRESSION: ICD-10-CM

## 2019-05-02 DIAGNOSIS — Z79.899 ENCOUNTER FOR DRUG THERAPY: ICD-10-CM

## 2019-05-02 DIAGNOSIS — F41.9 ANXIETY AND DEPRESSION: ICD-10-CM

## 2019-05-03 RX ORDER — CLONAZEPAM 1 MG/1
TABLET ORAL
Qty: 60 TABLET | Refills: 0 | Status: SHIPPED | OUTPATIENT
Start: 2019-05-03 | End: 2020-04-14

## 2019-05-03 NOTE — TELEPHONE ENCOUNTER
Patient notified and verbalized understanding of information given.    Script faxed over to pharmacy

## 2019-06-06 ENCOUNTER — PATIENT OUTREACH (OUTPATIENT)
Dept: FAMILY MEDICINE CLINIC | Facility: CLINIC | Age: 39
End: 2019-06-06

## 2019-06-10 ENCOUNTER — TELEPHONE (OUTPATIENT)
Dept: FAMILY MEDICINE CLINIC | Facility: CLINIC | Age: 39
End: 2019-06-10

## 2019-06-10 DIAGNOSIS — Z51.81 ENCOUNTER FOR THERAPEUTIC DRUG MONITORING: ICD-10-CM

## 2019-06-10 DIAGNOSIS — F32.A ANXIETY AND DEPRESSION: ICD-10-CM

## 2019-06-10 DIAGNOSIS — F41.9 ANXIETY AND DEPRESSION: ICD-10-CM

## 2019-06-10 RX ORDER — QUETIAPINE 25 MG/1
TABLET, FILM COATED ORAL
Qty: 30 TABLET | Refills: 0 | OUTPATIENT
Start: 2019-06-10

## 2019-06-10 NOTE — TELEPHONE ENCOUNTER
clonazePAM 1 MG Oral Tab   QUEtiapine Fumarate 25 MG Oral Tab   PLEASE SEND REFILL TO [facility] [place].

## 2019-06-10 NOTE — TELEPHONE ENCOUNTER
Last ov 2/22/19:  ASSESSMENT AND PLAN:      Encounter for drug therapy  (primary encounter diagnosis)  Visit for screening mammogram  Vitamin b 12 deficiency  Vitamin d deficiency  Anxiety and depression    Imaging & Consults:  [facility] SCREENING BILAT (CPT=770

## 2019-06-10 NOTE — TELEPHONE ENCOUNTER
Pt called back and states she has [facility]. Pt is aware Dr. Diana Mae is not part of the [facility] network.  Pt is aware per ov with Dr Diana Mae and a phone dated 3/15/19  would fill the medication one time and she needed to establish with a new PCP or patton

## 2019-06-10 NOTE — TELEPHONE ENCOUNTER
[facility] and Quetiapine.  casa

## 2019-07-29 ENCOUNTER — TELEPHONE (OUTPATIENT)
Dept: FAMILY MEDICINE CLINIC | Facility: CLINIC | Age: 39
End: 2019-07-29

## 2019-07-29 NOTE — TELEPHONE ENCOUNTER
REFILL CLONAZEPAM TO [facility], SHE IS ALL OUT & IT'S NOT GOOD WHEN SHE IS WITHOUT IT, HER INS WILL BE EFFT 8-1-19, [facility]

## 2019-07-29 NOTE — TELEPHONE ENCOUNTER
Spoke to spouse who states that he just signed the paperwork for insurance yesterday and it will become effective 8/1/2019so can Dr. Gómez Robins write a script for clonazepam. Advised that pt needs to have verifiable insurance but if symptoms are that bad, pt c

## 2020-02-10 NOTE — PROGRESS NOTES
Laura Kendall is a 37year old female. HPI:   [employer] has changed back to [employer] replacement BC Bs. Was seeing Dr. Leila Malone in hospitals. Is on gabapentin tid and cloazepam bid. Feels anxious and depressed. Minimal alcohol.  Smokes 1/2 ppd. meds are cur good BS's,no masses, HSM or tenderness  EXTREMITIES: no cyanosis, clubbing or edema    ASSESSMENT AND PLAN:     Cigarette smoker  (primary encounter diagnosis)  Depression, unspecified depression type  Visit for screening mammogram    Orders Placed This En total).

## 2020-02-12 ENCOUNTER — TELEPHONE (OUTPATIENT)
Dept: FAMILY MEDICINE CLINIC | Facility: CLINIC | Age: 40
End: 2020-02-12

## 2020-02-12 NOTE — TELEPHONE ENCOUNTER
Spoke with patient and per Dr. Ivelisse Carr advised finishing current antibiotic, using OTC medication for symptoms. Advised that cough may last for another 5 days to a week, however if symptoms worsen or do not resolve then she should schedule a follow up.  Leena

## 2020-02-12 NOTE — TELEPHONE ENCOUNTER
She is asking for meds to be called in for her cough. I asked if she saw Dr. Tone Phillips for the cough and she said Dr. Tone Phillips is aware she saw another doc for the cough and gave her meds but they are not working. I offered appt. But pt.  Is hoping to just get m

## 2020-04-13 DIAGNOSIS — F32.A ANXIETY AND DEPRESSION: ICD-10-CM

## 2020-04-13 DIAGNOSIS — F41.9 ANXIETY AND DEPRESSION: ICD-10-CM

## 2020-04-13 DIAGNOSIS — Z79.899 ENCOUNTER FOR DRUG THERAPY: ICD-10-CM

## 2020-04-13 NOTE — TELEPHONE ENCOUNTER
Clonazepam last refilled on 5/13/2019  Gabapentin never filled by Dr Padmaja Augustin; but reported on med list by patient  Last seen 2/20/2020; in visit charting says TID for Gabapentin; but medication list says daily. She was suppose to f/u in 4 weeks.   Will [facility]

## 2020-04-13 NOTE — TELEPHONE ENCOUNTER
TK ONE C PO TID   Dispensed: 3/16/2020 12:00 AM   Written:  2020   Days supply: 30   Quantity: 90 each   Refills remainin   Pharmacy: [facility] [address] [place] [address] ([address]), [phone], [phone]

## 2020-04-14 RX ORDER — CLONAZEPAM 1 MG/1
TABLET ORAL
Qty: 60 TABLET | Refills: 0 | Status: SHIPPED | OUTPATIENT
Start: 2020-04-14 | End: 2020-05-13

## 2020-04-14 RX ORDER — GABAPENTIN 300 MG/1
300 CAPSULE ORAL DAILY
Qty: 30 CAPSULE | Refills: 0 | Status: SHIPPED | OUTPATIENT
Start: 2020-04-14 | End: 2021-04-20

## 2020-05-13 DIAGNOSIS — F41.9 ANXIETY AND DEPRESSION: ICD-10-CM

## 2020-05-13 DIAGNOSIS — Z79.899 ENCOUNTER FOR DRUG THERAPY: ICD-10-CM

## 2020-05-13 DIAGNOSIS — F32.A ANXIETY AND DEPRESSION: ICD-10-CM

## 2020-05-13 RX ORDER — CLONAZEPAM 1 MG/1
TABLET ORAL
Qty: 60 TABLET | Refills: 0 | Status: SHIPPED | OUTPATIENT
Start: 2020-05-13 | End: 2020-09-14

## 2020-06-12 NOTE — TELEPHONE ENCOUNTER
Left detailed message on verified vm.  To call back and schedule a physical in august with Dr Ferrara Section

## 2020-06-17 ENCOUNTER — TELEPHONE (OUTPATIENT)
Dept: FAMILY MEDICINE CLINIC | Facility: CLINIC | Age: 40
End: 2020-06-17

## 2020-06-17 DIAGNOSIS — L23.7 POISON IVY DERMATITIS: Primary | ICD-10-CM

## 2020-06-17 PROCEDURE — 99213 OFFICE O/P EST LOW 20 MIN: CPT | Performed by: INTERNAL MEDICINE

## 2020-06-17 NOTE — TELEPHONE ENCOUNTER
Left msg on voicemail for patient to call to give us more information regarding rash. Dr. Dana Jones would like clarification of symptoms before prescribing anything.

## 2020-06-18 RX ORDER — PREDNISONE 20 MG/1
40 TABLET ORAL DAILY
Qty: 14 TABLET | Refills: 0 | Status: SHIPPED | OUTPATIENT
Start: 2020-06-18 | End: 2020-06-25

## 2020-06-18 NOTE — TELEPHONE ENCOUNTER
This visit was conducted via PHONE with audio components during the [place] 19 pandemic due to shelter at home rules. Pt accepted responsibility for payment and video conducted in [place]. Addie Nunez is a 37year old female.   HPI:    PT HAS poison ivy all ove No orders of the defined types were placed in this encounter.       Meds & Refills for this Visit:  Requested Prescriptions     Signed Prescriptions Disp Refills   • predniSONE 20 MG Oral Tab 14 tablet 0     Sig: Take 2 tablets (40 mg total) by mouth zoë

## 2020-06-18 NOTE — TELEPHONE ENCOUNTER
Patient said that she has poison ivy allover her body for the last 4 days after chasing a cat through the weeds. She said she has had itching, oozing. She said she has been applying Calamine and Caladryl but the rash is spreading to her face.  She uses the

## 2020-07-15 DIAGNOSIS — F41.9 ANXIETY: ICD-10-CM

## 2020-07-15 RX ORDER — CLONAZEPAM 1 MG/1
1 TABLET ORAL 2 TIMES DAILY PRN
Qty: 60 TABLET | Refills: 0 | Status: SHIPPED | OUTPATIENT
Start: 2020-07-15 | End: 2020-08-11

## 2020-07-15 NOTE — TELEPHONE ENCOUNTER
LOV 2/10/20    LAST LAB    LAST RX 6/12/20 30 day    Next OV   Future Appointments   Date Time Provider Saray Roque   8/25/2020  1:30 PM Gary Garvey, DO EM[facility] [place]         PROTOCOL  none

## 2020-08-11 DIAGNOSIS — F41.9 ANXIETY: ICD-10-CM

## 2020-08-11 RX ORDER — CLONAZEPAM 1 MG/1
1 TABLET ORAL 2 TIMES DAILY PRN
Qty: 60 TABLET | Refills: 0 | Status: SHIPPED | OUTPATIENT
Start: 2020-08-11 | End: 2020-09-14

## 2020-08-11 NOTE — TELEPHONE ENCOUNTER
Future Appointments   Date Time Provider Saray Mya   8/25/2020  1:30 PM Stephie Serrato DO EM[facility] [place]     Rx was written 7/15/20 x 60 (a few days early)

## 2020-08-25 ENCOUNTER — TELEPHONE (OUTPATIENT)
Dept: FAMILY MEDICINE CLINIC | Facility: CLINIC | Age: 40
End: 2020-08-25

## 2020-08-25 NOTE — TELEPHONE ENCOUNTER
Called patient and lm regarding her no show with dr Brannon Fairly on 08/25/2020 and the no show fee of $40.00. Also advised to call office back to reschedule if needed.

## 2020-09-08 DIAGNOSIS — F41.9 ANXIETY: ICD-10-CM

## 2020-09-09 RX ORDER — GABAPENTIN 300 MG/1
CAPSULE ORAL
Qty: 90 CAPSULE | Refills: 0 | Status: SHIPPED | OUTPATIENT
Start: 2020-09-09 | End: 2020-12-11

## 2020-09-09 NOTE — TELEPHONE ENCOUNTER
Last OV: 2/10/20  Last refill: 6/12/20 #90 Capsules w/ 0 refills  Requested Prescriptions     Pending Prescriptions Disp Refills   • GABAPENTIN 300 MG Oral Cap [Pharmacy Med Name: GABAPENTIN 300MG CAPSULES] 90 capsule 0     Sig: TAKE 1 CAPSULE(300 MG) BY M

## 2020-09-14 PROBLEM — Z87.898 HISTORY OF COCAINE USE: Status: ACTIVE | Noted: 2018-08-28

## 2020-09-14 PROBLEM — F31.81 BIPOLAR 2 DISORDER (HCC): Status: ACTIVE | Noted: 2018-06-13

## 2020-09-14 PROBLEM — F14.91 HISTORY OF COCAINE USE: Status: ACTIVE | Noted: 2018-08-28

## 2020-09-14 NOTE — PROGRESS NOTES
HPI: HPI   Patient is here for a follow up visit for anxiety. She has had some increased anxiety within the last few months. Her first grandson was born about 1 month ago. She was very anxious with the delivery and hopes for a healthy baby.  She has not Brother       Social History    Tobacco Use      Smoking status: Current Every Day Smoker        Packs/day: 1.00        Years: 18.00        Pack years: 18        Types: Cigarettes      Smokeless tobacco: Never Used      Tobacco comment: 1 PPD    Alcohol us 2 (two) times daily as needed for Anxiety. -     QUEtiapine Fumarate ER (SEROQUEL XR) 50 MG Oral Tablet 24 Hr; Take 0.5 tablets (25 mg total) by mouth nightly. Tobacco use disorder  Comments:  Discussed influenza vaccine.  She plans to get that done in

## 2020-10-20 DIAGNOSIS — Z79.899 ENCOUNTER FOR DRUG THERAPY: ICD-10-CM

## 2020-10-20 DIAGNOSIS — F41.9 ANXIETY AND DEPRESSION: ICD-10-CM

## 2020-10-20 DIAGNOSIS — F32.A ANXIETY AND DEPRESSION: ICD-10-CM

## 2020-10-20 RX ORDER — CLONAZEPAM 1 MG/1
1 TABLET ORAL 2 TIMES DAILY PRN
Qty: 60 TABLET | Refills: 0 | Status: SHIPPED | OUTPATIENT
Start: 2020-10-20 | End: 2020-11-16

## 2020-10-21 RX ORDER — CLONAZEPAM 1 MG/1
TABLET ORAL
Qty: 60 TABLET | Refills: 0 | OUTPATIENT
Start: 2020-10-21

## 2020-11-16 DIAGNOSIS — F41.9 ANXIETY AND DEPRESSION: ICD-10-CM

## 2020-11-16 DIAGNOSIS — F32.A ANXIETY AND DEPRESSION: ICD-10-CM

## 2020-11-16 DIAGNOSIS — Z79.899 ENCOUNTER FOR DRUG THERAPY: ICD-10-CM

## 2020-11-16 RX ORDER — CLONAZEPAM 1 MG/1
1 TABLET ORAL 2 TIMES DAILY PRN
Qty: 60 TABLET | Refills: 0 | Status: SHIPPED | OUTPATIENT
Start: 2020-11-16 | End: 2020-12-11

## 2020-11-16 NOTE — TELEPHONE ENCOUNTER
Tried to call pt to clarify Gabapentin which was given on 9/9/20 #90 (Sig indicates to take one ad HS) refill too soon    Voicemail full    Last clonazepam 10/20/20 #60    Dr Sonja Gerber please advise refill in Dr Roya Gleason absence    Last [address] with APRN on 9/14/20

## 2020-12-11 DIAGNOSIS — Z79.899 ENCOUNTER FOR DRUG THERAPY: ICD-10-CM

## 2020-12-11 DIAGNOSIS — F41.9 ANXIETY: ICD-10-CM

## 2020-12-11 DIAGNOSIS — F32.A ANXIETY AND DEPRESSION: ICD-10-CM

## 2020-12-11 DIAGNOSIS — F41.9 ANXIETY AND DEPRESSION: ICD-10-CM

## 2020-12-11 RX ORDER — CLONAZEPAM 1 MG/1
1 TABLET ORAL 2 TIMES DAILY PRN
Qty: 60 TABLET | Refills: 0 | Status: SHIPPED | OUTPATIENT
Start: 2020-12-11 | End: 2021-01-12

## 2020-12-11 RX ORDER — GABAPENTIN 300 MG/1
300 CAPSULE ORAL NIGHTLY
Qty: 90 CAPSULE | Refills: 0 | Status: SHIPPED | OUTPATIENT
Start: 2020-12-11 | End: 2021-02-22

## 2020-12-11 NOTE — TELEPHONE ENCOUNTER
Patient due for OV to follow up on meds. Spoke with patient who is experiencing productive cough and nasal congestion. Appt made for next week in resp clinic, will make follow up appt with Dr Roseline Fall when feeling better.  Offered video visit for follow up ho

## 2020-12-15 ENCOUNTER — OFFICE VISIT (OUTPATIENT)
Dept: FAMILY MEDICINE CLINIC | Facility: CLINIC | Age: 40
End: 2020-12-15
Payer: MEDICAID

## 2020-12-15 VITALS
DIASTOLIC BLOOD PRESSURE: 80 MMHG | OXYGEN SATURATION: 98 % | SYSTOLIC BLOOD PRESSURE: 110 MMHG | HEART RATE: 86 BPM | TEMPERATURE: 97 F

## 2020-12-15 DIAGNOSIS — J20.9 BRONCHITIS WITH BRONCHOSPASM: ICD-10-CM

## 2020-12-15 DIAGNOSIS — F17.200 TOBACCO USE DISORDER: ICD-10-CM

## 2020-12-15 DIAGNOSIS — J01.40 ACUTE NON-RECURRENT PANSINUSITIS: Primary | ICD-10-CM

## 2020-12-15 PROCEDURE — 3079F DIAST BP 80-89 MM HG: CPT | Performed by: FAMILY MEDICINE

## 2020-12-15 PROCEDURE — 99213 OFFICE O/P EST LOW 20 MIN: CPT | Performed by: FAMILY MEDICINE

## 2020-12-15 PROCEDURE — 3074F SYST BP LT 130 MM HG: CPT | Performed by: FAMILY MEDICINE

## 2020-12-15 RX ORDER — ALBUTEROL SULFATE 90 UG/1
2 AEROSOL, METERED RESPIRATORY (INHALATION) EVERY 4 HOURS PRN
Qty: 1 INHALER | Refills: 0 | Status: SHIPPED | OUTPATIENT
Start: 2020-12-15 | End: 2021-04-20

## 2020-12-15 RX ORDER — CEFDINIR 300 MG/1
300 CAPSULE ORAL 2 TIMES DAILY
Qty: 20 CAPSULE | Refills: 0 | Status: SHIPPED | OUTPATIENT
Start: 2020-12-15 | End: 2020-12-25

## 2020-12-15 RX ORDER — ALBUTEROL SULFATE 90 UG/1
2 AEROSOL, METERED RESPIRATORY (INHALATION) EVERY 4 HOURS PRN
Qty: 1 INHALER | Refills: 6 | Status: CANCELLED | OUTPATIENT
Start: 2020-12-15

## 2020-12-15 RX ORDER — FLUTICASONE PROPIONATE 50 MCG
2 SPRAY, SUSPENSION (ML) NASAL DAILY
Qty: 1 BOTTLE | Refills: 0 | COMMUNITY
Start: 2020-12-15 | End: 2021-07-14

## 2020-12-15 RX ORDER — PREDNISONE 20 MG/1
20 TABLET ORAL 2 TIMES DAILY
Qty: 10 TABLET | Refills: 0 | Status: SHIPPED | OUTPATIENT
Start: 2020-12-15 | End: 2020-12-20

## 2020-12-15 NOTE — PROGRESS NOTES
HPI:   Gilmar Ball is a 37year old female who presents for upper respiratory symptoms for  4  weeks. Patient reports green colored nasal discharge, cough with green colored sputum, wheezing, OTC cold meds have not been helping, denies fever.   Patient pre Types: Cigarettes      Smokeless tobacco: Never Used      Tobacco comment: 1 PPD    Alcohol use:  Yes      Alcohol/week: 0.0 standard drinks      Comment: occ    Drug use: No        REVIEW OF SYSTEMS:   GENERAL: fever: no, chills:no, fatigue:  +  SKIN: no r every 4 (four) hours as needed for Wheezing or Shortness of Breath. • Fluticasone Propionate 50 MCG/ACT Nasal Suspension 1 Bottle 0     Si sprays by Each Nare route daily. Imaging & Consults:  None  No follow-ups on file.   Patient Instructions

## 2020-12-15 NOTE — PATIENT INSTRUCTIONS
Push fluids, nasal saline ad teddy., Flonase 2 sprays per nostril daily, proper intranasal administration reviewed  Mucinex or equivalent expectorant twice daily  Discussed smoking cessation strategies, support given  Cefdinir 300 mg twice daily x10 days dolores

## 2021-01-12 DIAGNOSIS — Z79.899 ENCOUNTER FOR DRUG THERAPY: ICD-10-CM

## 2021-01-12 DIAGNOSIS — F41.9 ANXIETY AND DEPRESSION: ICD-10-CM

## 2021-01-12 DIAGNOSIS — F32.A ANXIETY AND DEPRESSION: ICD-10-CM

## 2021-01-12 RX ORDER — CLONAZEPAM 1 MG/1
1 TABLET ORAL 2 TIMES DAILY PRN
Qty: 60 TABLET | Refills: 0 | Status: SHIPPED | OUTPATIENT
Start: 2021-01-12 | End: 2021-02-05

## 2021-01-12 NOTE — TELEPHONE ENCOUNTER
LOV  12/15/20 acute             9/14/20  -px    LAST LAB    LAST RX   12/11/20 30 days 0 refills    Next OV    Future Appointments   Date Time Provider Saray Roque   1/13/2021 10:00 AM Cathleen Garvey Mai, DO [facility] [place]         PROTOCOL  none

## 2021-01-13 ENCOUNTER — TELEMEDICINE (OUTPATIENT)
Dept: FAMILY MEDICINE CLINIC | Facility: CLINIC | Age: 41
End: 2021-01-13
Payer: MEDICAID

## 2021-01-13 DIAGNOSIS — Z12.31 BREAST CANCER SCREENING BY MAMMOGRAM: ICD-10-CM

## 2021-01-13 DIAGNOSIS — R43.0 LOSS OF SMELL: ICD-10-CM

## 2021-01-13 DIAGNOSIS — R06.02 SHORT OF BREATH ON EXERTION: ICD-10-CM

## 2021-01-13 DIAGNOSIS — R09.81 CONGESTION OF NASAL SINUS: ICD-10-CM

## 2021-01-13 DIAGNOSIS — J01.00 ACUTE NON-RECURRENT MAXILLARY SINUSITIS: Primary | ICD-10-CM

## 2021-01-13 DIAGNOSIS — R43.2 LOSS OF TASTE: ICD-10-CM

## 2021-01-13 PROCEDURE — 99213 OFFICE O/P EST LOW 20 MIN: CPT | Performed by: FAMILY MEDICINE

## 2021-01-13 RX ORDER — AZITHROMYCIN 250 MG/1
TABLET, FILM COATED ORAL
Qty: 6 TABLET | Refills: 0 | Status: SHIPPED | OUTPATIENT
Start: 2021-01-13 | End: 2021-01-18

## 2021-01-13 NOTE — PATIENT INSTRUCTIONS
Coronavirus Disease 2019 (COVID-19): Overview  Coronavirus disease 2019 (COVID-19) is a respiratory illness. It's caused by a new (novel) coronavirus. There are many types of coronavirus. Coronaviruses are a very common cause of colds and bronchitis.  The · New loss of sense of smell or taste  You can check your symptoms with the CDC’s Coronavirus Self-. What are possible complications from [id number]? In many cases, this virus can cause infection (pneumonia) in both lungs.  In some cases, this can ca Your healthcare provider will ask about your symptoms. He or she will ask where you live, and about your recent travel, and any contact with sick people.  If your healthcare provider thinks you may have COVID-19, he or she will consider whether to test you · Antigen test. This can find proteins from the SARS-CoV-2 virus. This is done by a nose or a nose-throat swab. Depending on the test, some results are back within an hour.  Positive results are highly accurate, but false positives can happen, especially in The FDA has approved a vaccine to prevent COVID-19 in people 16 years and older who are not pregnant or breastfeeding. It's not currently available to the entire public.  The first phase of vaccine roll-out will go to healthcare staff and residents of long- · Remdesivir. The FDA has approved an IV (intravenous) antiviral medicine called remdesivir. It works by stopping the spread of the SARS-CoV-2 virus in the body.  It's approved for people in the hospital. It's for people 12 years and older who weigh more th You are at risk for COVID-19 if you have had close contact with someone with the virus, or if you live in or traveled to an area with cases of it. Close contact means being within 6 feet of a person known to have COVID-19 for a total of 15 minutes or more.

## 2021-01-13 NOTE — PROGRESS NOTES
Virtual/Telephone Check-In    Shellie Stearns verbally consents to a [employer] on 01/13/21. Patient has been referred to the [facility] website at [email or web address] to review the yearly Consent to Treat document.   Patient Eleln Zimmerman state, unspecified    • Closed rib fracture 9/2008   • Depression    • Obesity    • Reactive airway disease with wheezing    • Tobacco dependence       Social History:  Social History    Tobacco Use      Smoking status: Current Every Day Smoker        Pack following visit was completed using two way, real -time interactive audio and/or video communication.  This has been done in good matteo to provide continuity of care in the best interest of the provider-patient relationship, due  to the ongoing public healt

## 2021-02-05 DIAGNOSIS — F32.A ANXIETY AND DEPRESSION: ICD-10-CM

## 2021-02-05 DIAGNOSIS — Z79.899 ENCOUNTER FOR DRUG THERAPY: ICD-10-CM

## 2021-02-05 DIAGNOSIS — F41.9 ANXIETY AND DEPRESSION: ICD-10-CM

## 2021-02-05 RX ORDER — CLONAZEPAM 1 MG/1
TABLET ORAL
Qty: 60 TABLET | Refills: 0 | Status: SHIPPED | OUTPATIENT
Start: 2021-02-05 | End: 2021-03-08

## 2021-02-05 NOTE — TELEPHONE ENCOUNTER
Last refill #60 on 1/12/2021    Last office visit pertaining to refill on 9/14/2020  No future appointments.   Patient is asking for a refill 7 days early

## 2021-02-21 DIAGNOSIS — F41.9 ANXIETY: ICD-10-CM

## 2021-02-22 RX ORDER — GABAPENTIN 300 MG/1
CAPSULE ORAL
Qty: 90 CAPSULE | Refills: 0 | Status: SHIPPED | OUTPATIENT
Start: 2021-02-22 | End: 2021-03-08

## 2021-02-22 NOTE — TELEPHONE ENCOUNTER
Last refill #90 on 12/11/2020  Last office visit pertaining to refill on 9/14/2020 - with Sondra  No future appointments.

## 2021-03-08 DIAGNOSIS — F41.9 ANXIETY AND DEPRESSION: ICD-10-CM

## 2021-03-08 DIAGNOSIS — Z79.899 ENCOUNTER FOR DRUG THERAPY: ICD-10-CM

## 2021-03-08 DIAGNOSIS — F41.9 ANXIETY: ICD-10-CM

## 2021-03-08 DIAGNOSIS — F32.A ANXIETY AND DEPRESSION: ICD-10-CM

## 2021-03-08 RX ORDER — GABAPENTIN 300 MG/1
300 CAPSULE ORAL NIGHTLY
Qty: 90 CAPSULE | Refills: 0 | Status: SHIPPED | OUTPATIENT
Start: 2021-03-08 | End: 2021-04-20

## 2021-03-08 RX ORDER — CLONAZEPAM 1 MG/1
1 TABLET ORAL 2 TIMES DAILY PRN
Qty: 60 TABLET | Refills: 0 | Status: SHIPPED | OUTPATIENT
Start: 2021-03-08 | End: 2021-04-08

## 2021-03-08 NOTE — TELEPHONE ENCOUNTER
CLONAZEPAM 1 MG Oral Tab    GABAPENTIN 300 MG Oral Cap    [facility] #[id number] [address] AT [address] [place] [address] ([address]), [phone], [phone]    LAST VISIT WAS ON 01/13/2021 - VIDEO VISIT  AND 12/15/2020 - IN OFFICE

## 2021-03-08 NOTE — TELEPHONE ENCOUNTER
Last refill on gabapentin #90 on 2/22/2021  Last refill on clonazepam #60 on 2/5/2021    Last office visit pertaining to refill on 9/14/2020  No future appointments.

## 2021-04-08 DIAGNOSIS — F32.A ANXIETY AND DEPRESSION: ICD-10-CM

## 2021-04-08 DIAGNOSIS — F41.9 ANXIETY AND DEPRESSION: ICD-10-CM

## 2021-04-08 DIAGNOSIS — Z79.899 ENCOUNTER FOR DRUG THERAPY: ICD-10-CM

## 2021-04-08 RX ORDER — CLONAZEPAM 1 MG/1
1 TABLET ORAL 2 TIMES DAILY PRN
Qty: 60 TABLET | Refills: 0 | Status: SHIPPED | OUTPATIENT
Start: 2021-04-08 | End: 2021-05-07

## 2021-04-08 NOTE — TELEPHONE ENCOUNTER
Catheter removed intact.   clonazePAM 1 MG Oral Tab    [facility] #[id number] - [address] [facility] ([address]), [phone], [phone]    LAST VISIT WAS ON 01/13/2021 - VIDEO VISIT

## 2021-04-08 NOTE — TELEPHONE ENCOUNTER
Last refill #60 on 3/8/2021  Last office visit pertaining to refill on 1/13/2021 - televisit  No future appointments.

## 2021-04-15 ENCOUNTER — TELEPHONE (OUTPATIENT)
Dept: FAMILY MEDICINE CLINIC | Facility: CLINIC | Age: 41
End: 2021-04-15

## 2021-04-15 NOTE — TELEPHONE ENCOUNTER
clonazePAM 1 MG Oral Tab    [facility] #[id number] - [address] [place] [address] ([address]), [phone], [phone]    LAST VISIT WAS ON 01/13/2021 - VIDEO VISIT

## 2021-04-15 NOTE — TELEPHONE ENCOUNTER
Last refill #60 on 4/8/2021. Verified with Nilda this medication was picked up on 4/8/2021. No way to contact patient to notify her she is not due until on or around 5/8/2021 for a refill.

## 2021-04-19 ENCOUNTER — TELEPHONE (OUTPATIENT)
Dept: FAMILY MEDICINE CLINIC | Facility: CLINIC | Age: 41
End: 2021-04-19

## 2021-04-19 NOTE — TELEPHONE ENCOUNTER
Patient asking for a refill on seroquel. She asked for a refill on clonazepam early on Thursday. Last office visit on 9/14/2020 with Phylicia Iraheta  Was prescribed #15 quetiapine 50mg to take 1/2 tab daily and follow up in one month.   Patient did not follow up

## 2021-04-20 ENCOUNTER — OFFICE VISIT (OUTPATIENT)
Dept: FAMILY MEDICINE CLINIC | Facility: CLINIC | Age: 41
End: 2021-04-20
Payer: MEDICAID

## 2021-04-20 VITALS
SYSTOLIC BLOOD PRESSURE: 110 MMHG | WEIGHT: 214.19 LBS | HEART RATE: 78 BPM | RESPIRATION RATE: 16 BRPM | BODY MASS INDEX: 37 KG/M2 | TEMPERATURE: 98 F | DIASTOLIC BLOOD PRESSURE: 84 MMHG

## 2021-04-20 DIAGNOSIS — Z79.899 ENCOUNTER FOR DRUG THERAPY: Primary | ICD-10-CM

## 2021-04-20 DIAGNOSIS — F32.A ANXIETY AND DEPRESSION: ICD-10-CM

## 2021-04-20 DIAGNOSIS — F41.9 ANXIETY AND DEPRESSION: ICD-10-CM

## 2021-04-20 DIAGNOSIS — G47.00 INSOMNIA, UNSPECIFIED TYPE: ICD-10-CM

## 2021-04-20 DIAGNOSIS — J30.1 SEASONAL ALLERGIC RHINITIS DUE TO POLLEN: ICD-10-CM

## 2021-04-20 DIAGNOSIS — Z12.31 BREAST CANCER SCREENING BY MAMMOGRAM: ICD-10-CM

## 2021-04-20 PROCEDURE — 3074F SYST BP LT 130 MM HG: CPT | Performed by: FAMILY MEDICINE

## 2021-04-20 PROCEDURE — 3079F DIAST BP 80-89 MM HG: CPT | Performed by: FAMILY MEDICINE

## 2021-04-20 PROCEDURE — 99214 OFFICE O/P EST MOD 30 MIN: CPT | Performed by: FAMILY MEDICINE

## 2021-04-20 RX ORDER — FLUOXETINE HYDROCHLORIDE 20 MG/1
20 CAPSULE ORAL DAILY
Qty: 30 CAPSULE | Refills: 1 | Status: SHIPPED | OUTPATIENT
Start: 2021-04-20 | End: 2021-05-21

## 2021-04-20 RX ORDER — FLUTICASONE PROPIONATE 50 MCG
2 SPRAY, SUSPENSION (ML) NASAL DAILY
Qty: 3 BOTTLE | Refills: 3 | Status: SHIPPED | OUTPATIENT
Start: 2021-04-20 | End: 2021-05-21

## 2021-04-20 RX ORDER — QUETIAPINE 50 MG/1
50 TABLET, FILM COATED ORAL NIGHTLY
Qty: 90 TABLET | Refills: 0 | Status: SHIPPED | OUTPATIENT
Start: 2021-04-20 | End: 2021-10-04

## 2021-04-20 RX ORDER — CETIRIZINE HYDROCHLORIDE 10 MG/1
10 TABLET ORAL DAILY
Qty: 90 TABLET | Refills: 0 | Status: SHIPPED | OUTPATIENT
Start: 2021-04-20

## 2021-04-20 RX ORDER — GABAPENTIN 300 MG/1
300 CAPSULE ORAL 3 TIMES DAILY
Qty: 90 CAPSULE | Refills: 0 | Status: SHIPPED | OUTPATIENT
Start: 2021-04-20 | End: 2021-05-21

## 2021-04-20 RX ORDER — IBUPROFEN 600 MG/1
600 TABLET ORAL
COMMUNITY
Start: 2020-12-29

## 2021-04-20 NOTE — PROGRESS NOTES
Anjelica Dye is a 41year old female. HPI:   Ava Tee feels very depressed. Is only on gabapentin and klonipin. Struggling with childrens father. Had a fractured left arm 12/2020 and still has not had cast taken off. Has to go to [employer].  Has autistic son 1 °C) (Temporal)   Resp 16   Wt 214 lb 3.2 oz (97.2 kg)   LMP 03/27/2021   Breastfeeding No   BMI 36.77 kg/m²   GENERAL: well developed, well nourished,in no apparent distress  SKIN: no rashes,no suspicious lesions  HEENT: atraumatic, normocephalic,ears and

## 2021-04-20 NOTE — PATIENT INSTRUCTIONS
Mammography    Mammography is an X-ray exam of your breast tissue. The image it makes is called a mammogram. A mammogram can help find problems with your breasts, such as cysts or cancer. Mammography is the best breast cancer screening tool available. 7144-1077 The [facility] 4037. All rights reserved. This information is not intended as a substitute for professional medical care. Always follow your healthcare professional's instructions.

## 2021-05-07 DIAGNOSIS — Z79.899 ENCOUNTER FOR DRUG THERAPY: ICD-10-CM

## 2021-05-07 DIAGNOSIS — F41.9 ANXIETY AND DEPRESSION: ICD-10-CM

## 2021-05-07 DIAGNOSIS — F32.A ANXIETY AND DEPRESSION: ICD-10-CM

## 2021-05-07 RX ORDER — CLONAZEPAM 1 MG/1
1 TABLET ORAL 2 TIMES DAILY PRN
Qty: 14 TABLET | Refills: 0 | Status: SHIPPED | OUTPATIENT
Start: 2021-05-07 | End: 2021-05-12

## 2021-05-07 NOTE — TELEPHONE ENCOUNTER
Attempted to call patient to schedule 1 mo f/u. N/A and unable to leave msg. LOV  4/20/21    LAST LAB    LAST RX  4/8/21 30 days 0 refills    Next OV  No future appointments.       PROTOCOL  none

## 2021-05-07 NOTE — TELEPHONE ENCOUNTER
clonazePAM 1 MG Oral Tab    [facility] #[id number] [address] [place] [address] ([address]), [phone], [phone]    LAST OFFICE VISIT WAS ON 04/20/2021

## 2021-05-12 DIAGNOSIS — F41.9 ANXIETY AND DEPRESSION: ICD-10-CM

## 2021-05-12 DIAGNOSIS — F32.A ANXIETY AND DEPRESSION: ICD-10-CM

## 2021-05-12 DIAGNOSIS — Z79.899 ENCOUNTER FOR DRUG THERAPY: ICD-10-CM

## 2021-05-12 RX ORDER — CLONAZEPAM 1 MG/1
1 TABLET ORAL 2 TIMES DAILY PRN
Qty: 30 TABLET | Refills: 0 | Status: SHIPPED | OUTPATIENT
Start: 2021-05-12 | End: 2021-06-08

## 2021-05-12 NOTE — TELEPHONE ENCOUNTER
Pt called last week for a rf on clonazepam and marium gave her 1 week of pills. She needs the rest of the pills filled [facility].

## 2021-05-12 NOTE — TELEPHONE ENCOUNTER
Patient returned call and scheduled OV. Pended Rx for 2 week supply. Previous Rx was for 7 days from [facility].      LOV  4/20/21    LAST LAB    LAST RX  5/7/21 7 days 0 refills    Next OV    Future Appointments   Date Time Provider Saray Roque   5/14/2021

## 2021-05-14 ENCOUNTER — TELEPHONE (OUTPATIENT)
Dept: FAMILY MEDICINE CLINIC | Facility: CLINIC | Age: 41
End: 2021-05-14

## 2021-05-19 ENCOUNTER — TELEPHONE (OUTPATIENT)
Dept: FAMILY MEDICINE CLINIC | Facility: CLINIC | Age: 41
End: 2021-05-19

## 2021-05-19 NOTE — TELEPHONE ENCOUNTER
Talked with patient and advised that refill was sent on 5/12/21, and we will see here on Friday for appt.   Future Appointments   Date Time Provider Saray Roque   5/21/2021  1:30 PM Radha Garvey, DO [facility]

## 2021-05-21 ENCOUNTER — OFFICE VISIT (OUTPATIENT)
Dept: FAMILY MEDICINE CLINIC | Facility: CLINIC | Age: 41
End: 2021-05-21
Payer: MEDICAID

## 2021-05-21 VITALS
BODY MASS INDEX: 36 KG/M2 | WEIGHT: 209.63 LBS | TEMPERATURE: 98 F | RESPIRATION RATE: 16 BRPM | HEART RATE: 72 BPM | DIASTOLIC BLOOD PRESSURE: 80 MMHG | SYSTOLIC BLOOD PRESSURE: 110 MMHG

## 2021-05-21 DIAGNOSIS — G47.00 INSOMNIA, UNSPECIFIED TYPE: ICD-10-CM

## 2021-05-21 DIAGNOSIS — F32.A ANXIETY AND DEPRESSION: ICD-10-CM

## 2021-05-21 DIAGNOSIS — F41.9 ANXIETY AND DEPRESSION: ICD-10-CM

## 2021-05-21 DIAGNOSIS — Z79.899 ENCOUNTER FOR DRUG THERAPY: Primary | ICD-10-CM

## 2021-05-21 DIAGNOSIS — Z12.31 BREAST CANCER SCREENING BY MAMMOGRAM: ICD-10-CM

## 2021-05-21 PROCEDURE — 3074F SYST BP LT 130 MM HG: CPT | Performed by: FAMILY MEDICINE

## 2021-05-21 PROCEDURE — 3079F DIAST BP 80-89 MM HG: CPT | Performed by: FAMILY MEDICINE

## 2021-05-21 PROCEDURE — 99214 OFFICE O/P EST MOD 30 MIN: CPT | Performed by: FAMILY MEDICINE

## 2021-05-21 RX ORDER — FLUOXETINE HYDROCHLORIDE 40 MG/1
40 CAPSULE ORAL DAILY
Qty: 90 CAPSULE | Refills: 0 | Status: SHIPPED | OUTPATIENT
Start: 2021-05-21 | End: 2021-08-24

## 2021-05-21 RX ORDER — GABAPENTIN 300 MG/1
300 CAPSULE ORAL 3 TIMES DAILY
Qty: 270 CAPSULE | Refills: 0 | Status: SHIPPED | OUTPATIENT
Start: 2021-05-21 | End: 2021-08-24

## 2021-05-21 NOTE — PROGRESS NOTES
Homer Reyes is a 41year old female. HPI:   Martin Walden is here for follow up on medications. Changed meds to prozac 20 mg. Added gabapening 300 mg tid, and continued klonipin 1 mg bid. Feels great but feels she could use prozac increased.   seroquel  50 mg at unusual skin lesions or rashes  RESPIRATORY: denies shortness of breath with exertion  CARDIOVASCULAR: denies chest pain on exertion  GI: denies abdominal pain and denies heartburn  NEURO: denies headaches    EXAM:   /80   Pulse 72   Temp 97.7 °F (36 nightly  - gabapentin 300 MG Oral Cap; Take 1 capsule (300 mg total) by mouth 3 (three) times daily. Dispense: 270 capsule; Refill: 0    4. Breast cancer screening by mammogram  - monthly SBE  - [facility] SCREENING [place] (CPT=77067);  Future     The patient isai

## 2021-06-07 DIAGNOSIS — F32.A ANXIETY AND DEPRESSION: ICD-10-CM

## 2021-06-07 DIAGNOSIS — Z79.899 ENCOUNTER FOR DRUG THERAPY: ICD-10-CM

## 2021-06-07 DIAGNOSIS — F41.9 ANXIETY AND DEPRESSION: ICD-10-CM

## 2021-06-07 RX ORDER — CLONAZEPAM 1 MG/1
TABLET ORAL
Qty: 30 TABLET | Refills: 0 | OUTPATIENT
Start: 2021-06-07

## 2021-06-07 NOTE — TELEPHONE ENCOUNTER
Last refill #30 on 5/12/2021  Last office visit pertaining to refill on 5/21/2021  Future Appointments   Date Time Provider Saray Roque   6/23/2021  1:45 PM Claudette Garvey DO [facility] Shreya Dominguez

## 2021-06-08 RX ORDER — CLONAZEPAM 1 MG/1
1 TABLET ORAL 2 TIMES DAILY PRN
Qty: 60 TABLET | Refills: 0 | Status: SHIPPED | OUTPATIENT
Start: 2021-06-08 | End: 2021-07-09

## 2021-06-22 ENCOUNTER — TELEPHONE (OUTPATIENT)
Dept: FAMILY MEDICINE CLINIC | Facility: CLINIC | Age: 41
End: 2021-06-22

## 2021-06-22 DIAGNOSIS — E53.8 LOW VITAMIN B12 LEVEL: ICD-10-CM

## 2021-06-22 DIAGNOSIS — N92.6 LATE MENSES: ICD-10-CM

## 2021-06-22 DIAGNOSIS — Z00.00 ROUTINE HEALTH MAINTENANCE: Primary | ICD-10-CM

## 2021-06-22 NOTE — TELEPHONE ENCOUNTER
JEROMY--PT HAS LAB APPT HERE TOMORROW, MAKE SURE LAB ORDERS ARE IN COMPUTER, HAS PX & PAP APPT HERE 7/14

## 2021-06-23 ENCOUNTER — LAB ENCOUNTER (OUTPATIENT)
Dept: LAB | Age: 41
End: 2021-06-23
Attending: FAMILY MEDICINE
Payer: MEDICAID

## 2021-06-23 ENCOUNTER — NURSE ONLY (OUTPATIENT)
Dept: FAMILY MEDICINE CLINIC | Facility: CLINIC | Age: 41
End: 2021-06-23
Payer: MEDICAID

## 2021-06-23 DIAGNOSIS — E53.8 LOW VITAMIN B12 LEVEL: ICD-10-CM

## 2021-06-23 DIAGNOSIS — Z00.00 ROUTINE HEALTH MAINTENANCE: ICD-10-CM

## 2021-06-23 PROCEDURE — 36415 COLL VENOUS BLD VENIPUNCTURE: CPT

## 2021-06-23 PROCEDURE — 84443 ASSAY THYROID STIM HORMONE: CPT

## 2021-06-23 PROCEDURE — 80053 COMPREHEN METABOLIC PANEL: CPT

## 2021-06-23 PROCEDURE — 80061 LIPID PANEL: CPT

## 2021-06-23 PROCEDURE — 85025 COMPLETE CBC W/AUTO DIFF WBC: CPT

## 2021-06-23 PROCEDURE — 81025 URINE PREGNANCY TEST: CPT | Performed by: FAMILY MEDICINE

## 2021-06-23 PROCEDURE — 82607 VITAMIN B-12: CPT

## 2021-06-29 ENCOUNTER — OFFICE VISIT (OUTPATIENT)
Dept: FAMILY MEDICINE CLINIC | Facility: CLINIC | Age: 41
End: 2021-06-29
Payer: MEDICAID

## 2021-06-29 VITALS
WEIGHT: 210.25 LBS | OXYGEN SATURATION: 96 % | DIASTOLIC BLOOD PRESSURE: 72 MMHG | HEART RATE: 85 BPM | TEMPERATURE: 98 F | SYSTOLIC BLOOD PRESSURE: 112 MMHG | BODY MASS INDEX: 36 KG/M2 | RESPIRATION RATE: 16 BRPM

## 2021-06-29 DIAGNOSIS — Z23 NEED FOR VACCINATION: ICD-10-CM

## 2021-06-29 DIAGNOSIS — T14.8XXA PUNCTURE WOUND: ICD-10-CM

## 2021-06-29 PROCEDURE — 3078F DIAST BP <80 MM HG: CPT | Performed by: FAMILY MEDICINE

## 2021-06-29 PROCEDURE — 3074F SYST BP LT 130 MM HG: CPT | Performed by: FAMILY MEDICINE

## 2021-06-29 PROCEDURE — 99213 OFFICE O/P EST LOW 20 MIN: CPT | Performed by: FAMILY MEDICINE

## 2021-06-29 PROCEDURE — 90471 IMMUNIZATION ADMIN: CPT | Performed by: FAMILY MEDICINE

## 2021-06-29 PROCEDURE — 90472 IMMUNIZATION ADMIN EACH ADD: CPT | Performed by: FAMILY MEDICINE

## 2021-06-29 PROCEDURE — 90715 TDAP VACCINE 7 YRS/> IM: CPT | Performed by: FAMILY MEDICINE
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
## 2021-07-09 DIAGNOSIS — Z79.899 ENCOUNTER FOR DRUG THERAPY: ICD-10-CM

## 2021-07-09 DIAGNOSIS — F32.A ANXIETY AND DEPRESSION: ICD-10-CM

## 2021-07-09 DIAGNOSIS — F41.9 ANXIETY AND DEPRESSION: ICD-10-CM

## 2021-07-09 RX ORDER — CLONAZEPAM 1 MG/1
1 TABLET ORAL 2 TIMES DAILY PRN
Qty: 60 TABLET | Refills: 0 | Status: SHIPPED | OUTPATIENT
Start: 2021-07-09 | End: 2021-08-03

## 2021-07-09 NOTE — TELEPHONE ENCOUNTER
LOV  5/21/21 last OV pertaining to refill    LAST LAB    LAST RX  6/8/21    Next OV    Future Appointments   Date Time Provider Saray Roque   7/14/2021  1:45 PM Miley Garvey DO [facility] [place]       PROTOCOL  none

## 2021-07-14 DIAGNOSIS — J41.0 SMOKERS' COUGH (HCC): ICD-10-CM

## 2021-07-14 NOTE — TELEPHONE ENCOUNTER
Albuterol Sulfate  (90 Base) MCG/ACT Inhalation Aero Soln   Fluticasone Propionate 50 MCG/ACT Nasal Suspension   [facility]

## 2021-07-14 NOTE — TELEPHONE ENCOUNTER
Proair: 9/14/29 #1 w/ 6 refills  Fluticasone: 12/15.20 #1 w/ 0 refills    Last OV: 6/29/21  Last labs: 6/23/21    Future Appointments   Date Time Provider Saray Roque   7/26/2021  1:15 PM TONI Fiore EM[facility] [place]

## 2021-07-15 RX ORDER — FLUTICASONE PROPIONATE 50 MCG
2 SPRAY, SUSPENSION (ML) NASAL DAILY
Qty: 9.9 ML | Refills: 0 | Status: SHIPPED | OUTPATIENT
Start: 2021-07-15 | End: 2021-09-02

## 2021-07-15 RX ORDER — ALBUTEROL SULFATE 90 UG/1
2 AEROSOL, METERED RESPIRATORY (INHALATION) EVERY 4 HOURS PRN
Qty: 8.5 G | Refills: 0 | Status: SHIPPED | OUTPATIENT
Start: 2021-07-15 | End: 2021-11-12

## 2021-07-26 ENCOUNTER — TELEPHONE (OUTPATIENT)
Dept: FAMILY MEDICINE CLINIC | Facility: CLINIC | Age: 41
End: 2021-07-26

## 2021-07-26 NOTE — TELEPHONE ENCOUNTER
She said that [facility] right now and that they need Dr Marlo Ruiz to call them to approve her being admitted. She just gave me her phone number so you can call her.

## 2021-07-28 ENCOUNTER — TELEPHONE (OUTPATIENT)
Dept: FAMILY MEDICINE CLINIC | Facility: CLINIC | Age: 41
End: 2021-07-28

## 2021-07-28 NOTE — TELEPHONE ENCOUNTER
Patient returned call and left msg with another concern. See TE from 7/27/21. Attempted to call patient back; N/A and unable to leave msg.

## 2021-07-28 NOTE — TELEPHONE ENCOUNTER
LOGAN HAS POISON MAMIE, SHE IS ASKING FOR FOR AN ORAL STEROID, THE WHITE ONE, NOT THE ORANGE-DERIC ONE.   PLEASE SEND TO [address] [facility] ON [facility].

## 2021-07-28 NOTE — TELEPHONE ENCOUNTER
Attempted to call patient for information on symptoms. N/A and unable to leave msg.  Per Dr. Wayne Solid patient will need to be seen, cannot prescribe medication without exam.

## 2021-07-29 NOTE — TELEPHONE ENCOUNTER
Pt. Called back asking if the meds were called in. I did inform pt. She would need to be seen and she said she is living in [place] right now and would not be able to get in here. She said she might just do a walk in clinic up in that area.

## 2021-08-03 ENCOUNTER — TELEPHONE (OUTPATIENT)
Dept: FAMILY MEDICINE CLINIC | Facility: CLINIC | Age: 41
End: 2021-08-03

## 2021-08-03 DIAGNOSIS — F41.9 ANXIETY AND DEPRESSION: ICD-10-CM

## 2021-08-03 DIAGNOSIS — F32.A ANXIETY AND DEPRESSION: ICD-10-CM

## 2021-08-03 DIAGNOSIS — Z79.899 ENCOUNTER FOR DRUG THERAPY: ICD-10-CM

## 2021-08-03 RX ORDER — CLONAZEPAM 1 MG/1
1 TABLET ORAL 2 TIMES DAILY PRN
Qty: 60 TABLET | Refills: 0 | Status: SHIPPED | OUTPATIENT
Start: 2021-08-03 | End: 2021-08-04 | Stop reason: CLARIF

## 2021-08-03 NOTE — TELEPHONE ENCOUNTER
ANTABUSE SHE USED TO TAKE THIS MED. SHE WANTS A REFILL.   PLEASE SEND REFILL TO [address]

## 2021-08-03 NOTE — TELEPHONE ENCOUNTER
Last OV: 6/29/21  Last refill: 7/9/21 #60 Tablets w/ 0 refills  Requested Prescriptions     Pending Prescriptions Disp Refills   • clonazePAM 1 MG Oral Tab 60 tablet 0     Sig: Take 1 tablet (1 mg total) by mouth 2 (two) times daily as needed.      No futur

## 2021-08-04 DIAGNOSIS — F41.9 ANXIETY AND DEPRESSION: ICD-10-CM

## 2021-08-04 DIAGNOSIS — Z79.899 ENCOUNTER FOR DRUG THERAPY: ICD-10-CM

## 2021-08-04 DIAGNOSIS — F32.A ANXIETY AND DEPRESSION: ICD-10-CM

## 2021-08-04 RX ORDER — CLONAZEPAM 1 MG/1
1 TABLET ORAL 2 TIMES DAILY PRN
Qty: 60 TABLET | Refills: 0 | Status: SHIPPED | OUTPATIENT
Start: 2021-08-04 | End: 2021-09-02

## 2021-08-04 NOTE — TELEPHONE ENCOUNTER
Clonazepam    [facility].     It needs to go to [facility] in [place] # [phone]

## 2021-08-04 NOTE — TELEPHONE ENCOUNTER
Pharmacist called to verify dosage of Clanazepam, states patient states she is taking 2 1/2 tabs daily so needs refill early. Per Dr. Catracho Bowen patient is only to take 2 tabs daily as needed. Pharmacy notified of correct dosage and not to fill request early.

## 2021-08-09 NOTE — TELEPHONE ENCOUNTER
Jessie Said needs to come in to discuss starting antabuse. This is typically ordered by  Psychiatry. She will need baseline comp and CBC then needs to return 2 weeks into taking the medication to recheck labs to make sure no liver inflammation ( CBC and COMP).  I

## 2021-08-17 NOTE — TELEPHONE ENCOUNTER
Attempted to call patient at contact number given on release form and person answering phone states patient is no longer at that number. Attempted to call patient at number she gave at time of initial call and there is N/A and no voicemail.

## 2021-08-24 DIAGNOSIS — G47.00 INSOMNIA, UNSPECIFIED TYPE: ICD-10-CM

## 2021-08-24 DIAGNOSIS — Z79.899 ENCOUNTER FOR DRUG THERAPY: ICD-10-CM

## 2021-08-24 DIAGNOSIS — F32.A ANXIETY AND DEPRESSION: ICD-10-CM

## 2021-08-24 DIAGNOSIS — F41.9 ANXIETY AND DEPRESSION: ICD-10-CM

## 2021-08-24 RX ORDER — GABAPENTIN 300 MG/1
300 CAPSULE ORAL 3 TIMES DAILY
Qty: 270 CAPSULE | Refills: 0 | Status: SHIPPED | OUTPATIENT
Start: 2021-08-24 | End: 2021-11-11

## 2021-08-24 RX ORDER — FLUOXETINE HYDROCHLORIDE 40 MG/1
40 CAPSULE ORAL DAILY
Qty: 90 CAPSULE | Refills: 0 | Status: SHIPPED | OUTPATIENT
Start: 2021-08-24 | End: 2021-11-29

## 2021-08-24 NOTE — TELEPHONE ENCOUNTER
FLUoxetine HCl 40 MG Oral Cap and gabapentin 300 MG Oral Cap call into [facility] on [facility] in [place]

## 2021-08-24 NOTE — TELEPHONE ENCOUNTER
LOV  Pertaining to refill 5/21/21  LAST LAB    LAST RX  Fluoxetine 5/21/21 90 days 0 refills                 Gabapentin 5/21/21 90 days 0 refills  Next OV    Future Appointments   Date Time Provider Saray Roque   9/22/2021  1:00 PM Keysha Garvey DO

## 2021-09-02 ENCOUNTER — TELEPHONE (OUTPATIENT)
Dept: FAMILY MEDICINE CLINIC | Facility: CLINIC | Age: 41
End: 2021-09-02

## 2021-09-02 DIAGNOSIS — F41.9 ANXIETY AND DEPRESSION: ICD-10-CM

## 2021-09-02 DIAGNOSIS — Z79.899 ENCOUNTER FOR DRUG THERAPY: ICD-10-CM

## 2021-09-02 DIAGNOSIS — F32.A ANXIETY AND DEPRESSION: ICD-10-CM

## 2021-09-02 RX ORDER — CLONAZEPAM 1 MG/1
1 TABLET ORAL 2 TIMES DAILY PRN
Qty: 60 TABLET | Refills: 0 | Status: SHIPPED | OUTPATIENT
Start: 2021-09-02 | End: 2021-10-05

## 2021-09-02 RX ORDER — FLUTICASONE PROPIONATE 50 MCG
2 SPRAY, SUSPENSION (ML) NASAL DAILY
Qty: 9.9 ML | Refills: 0 | Status: SHIPPED | OUTPATIENT
Start: 2021-09-02 | End: 2021-11-12

## 2021-09-02 NOTE — TELEPHONE ENCOUNTER
clonazePAM 1 MG Oral Tab  PLEASE SEND REFILL TO [facility] ON [facility] IN [place].  SHE WILL BE OUT OF MEDS ON 9/4/21

## 2021-09-02 NOTE — TELEPHONE ENCOUNTER
Requested Prescriptions     Pending Prescriptions Disp Refills   • clonazePAM 1 MG Oral Tab 60 tablet 0     Sig: Take 1 tablet (1 mg total) by mouth 2 (two) times daily as needed.      Last refill on 8/4/2021 #60  Last office visit pertaining to refill on 6

## 2021-09-02 NOTE — TELEPHONE ENCOUNTER
Last refill on 7/15/2021  Last office visit pertaining to refill on 6/29/2021  Future Appointments   Date Time Provider Saray Roque   9/22/2021  1:00 PM DEREK Garvey, DO [facility] [place]     Refilled per protocol

## 2021-09-22 ENCOUNTER — TELEPHONE (OUTPATIENT)
Dept: FAMILY MEDICINE CLINIC | Facility: CLINIC | Age: 41
End: 2021-09-22
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
## 2021-10-04 DIAGNOSIS — F32.A ANXIETY AND DEPRESSION: ICD-10-CM

## 2021-10-04 DIAGNOSIS — F41.9 ANXIETY AND DEPRESSION: ICD-10-CM

## 2021-10-04 DIAGNOSIS — Z79.899 ENCOUNTER FOR DRUG THERAPY: ICD-10-CM

## 2021-10-04 DIAGNOSIS — G47.00 INSOMNIA, UNSPECIFIED TYPE: ICD-10-CM

## 2021-10-04 NOTE — TELEPHONE ENCOUNTER
REFILL CLONAZEPAM & QUEtiapine TO [facility] ON [facility] [place], SHE IS ALL OUT OF BOTH

## 2021-10-04 NOTE — TELEPHONE ENCOUNTER
Requested Prescriptions     Pending Prescriptions Disp Refills   • clonazePAM 1 MG Oral Tab 60 tablet 0     Sig: Take 1 tablet (1 mg total) by mouth 2 (two) times daily as needed.    • QUEtiapine 50 MG Oral Tab 90 tablet 0     Sig: Take 1 tablet (50 mg tota

## 2021-10-05 RX ORDER — CLONAZEPAM 1 MG/1
1 TABLET ORAL 2 TIMES DAILY PRN
Qty: 60 TABLET | Refills: 0 | Status: SHIPPED | OUTPATIENT
Start: 2021-10-05 | End: 2021-11-01

## 2021-10-05 RX ORDER — QUETIAPINE 50 MG/1
50 TABLET, FILM COATED ORAL NIGHTLY
Qty: 30 TABLET | Refills: 0 | Status: SHIPPED | OUTPATIENT
Start: 2021-10-05 | End: 2021-11-01

## 2021-10-22 NOTE — TELEPHONE ENCOUNTER
clonazePAM 1 MG Oral Tab  Call to [facility]. Has upcoming px appt. yes

## 2021-11-01 DIAGNOSIS — G47.00 INSOMNIA, UNSPECIFIED TYPE: ICD-10-CM

## 2021-11-01 DIAGNOSIS — F41.9 ANXIETY AND DEPRESSION: ICD-10-CM

## 2021-11-01 DIAGNOSIS — Z79.899 ENCOUNTER FOR DRUG THERAPY: ICD-10-CM

## 2021-11-01 DIAGNOSIS — F32.A ANXIETY AND DEPRESSION: ICD-10-CM

## 2021-11-01 RX ORDER — CLONAZEPAM 1 MG/1
1 TABLET ORAL 2 TIMES DAILY PRN
Qty: 60 TABLET | Refills: 0 | Status: SHIPPED | OUTPATIENT
Start: 2021-11-01

## 2021-11-01 RX ORDER — QUETIAPINE 50 MG/1
50 TABLET, FILM COATED ORAL NIGHTLY
Qty: 30 TABLET | Refills: 0 | Status: SHIPPED | OUTPATIENT
Start: 2021-11-01

## 2021-11-01 NOTE — TELEPHONE ENCOUNTER
Requested Prescriptions      No prescriptions requested or ordered in this encounter     Last refill on both meds x 30 days on 10/5/2021  Last office visit pertaining to refill on 5/21/2021  No future appointments.

## 2021-11-10 DIAGNOSIS — F32.A ANXIETY AND DEPRESSION: ICD-10-CM

## 2021-11-10 DIAGNOSIS — J41.0 SMOKERS' COUGH (HCC): ICD-10-CM

## 2021-11-10 DIAGNOSIS — G47.00 INSOMNIA, UNSPECIFIED TYPE: ICD-10-CM

## 2021-11-10 DIAGNOSIS — F41.9 ANXIETY AND DEPRESSION: ICD-10-CM

## 2021-11-10 NOTE — TELEPHONE ENCOUNTER
Gabapentin, nasal spray and albuterol inhaler, [facility] on [address] in [place]. Pt was seen in June.

## 2021-11-11 NOTE — TELEPHONE ENCOUNTER
Requested Prescriptions     Pending Prescriptions Disp Refills   • fluticasone propionate 50 MCG/ACT Nasal Suspension 9.9 mL 0     Si sprays by Each Nare route daily.    • gabapentin 300 MG Oral Cap 270 capsule 0     Sig: Take 1 capsule (300 mg total) b

## 2021-11-12 RX ORDER — ALBUTEROL SULFATE 90 UG/1
2 AEROSOL, METERED RESPIRATORY (INHALATION) EVERY 4 HOURS PRN
Qty: 8.5 G | Refills: 0 | Status: SHIPPED | OUTPATIENT
Start: 2021-11-12

## 2021-11-12 RX ORDER — FLUTICASONE PROPIONATE 50 MCG
2 SPRAY, SUSPENSION (ML) NASAL DAILY
Qty: 9.9 ML | Refills: 0 | Status: SHIPPED | OUTPATIENT
Start: 2021-11-12

## 2021-11-12 RX ORDER — GABAPENTIN 300 MG/1
300 CAPSULE ORAL 3 TIMES DAILY
Qty: 90 CAPSULE | Refills: 0 | Status: SHIPPED | OUTPATIENT
Start: 2021-11-12 | End: 2021-11-23

## 2021-11-23 DIAGNOSIS — F41.9 ANXIETY AND DEPRESSION: ICD-10-CM

## 2021-11-23 DIAGNOSIS — F32.A ANXIETY AND DEPRESSION: ICD-10-CM

## 2021-11-23 DIAGNOSIS — G47.00 INSOMNIA, UNSPECIFIED TYPE: ICD-10-CM

## 2021-11-23 RX ORDER — GABAPENTIN 300 MG/1
300 CAPSULE ORAL 3 TIMES DAILY
Qty: 90 CAPSULE | Refills: 0 | Status: SHIPPED | OUTPATIENT
Start: 2021-11-23 | End: 2022-02-04

## 2021-11-23 NOTE — TELEPHONE ENCOUNTER
Last refill: 11/12/21  Qty: 90  W/ 0 refills  Last ov: 06/29/21    Requested Prescriptions     Pending Prescriptions Disp Refills   • gabapentin 300 MG Oral Cap 90 capsule 0     Sig: Take 1 capsule (300 mg total) by mouth 3 (three) times daily.  DUE FOR WEL

## 2021-11-29 DIAGNOSIS — Z79.899 ENCOUNTER FOR DRUG THERAPY: ICD-10-CM

## 2021-11-29 DIAGNOSIS — F32.A ANXIETY AND DEPRESSION: ICD-10-CM

## 2021-11-29 DIAGNOSIS — F41.9 ANXIETY AND DEPRESSION: ICD-10-CM

## 2021-11-29 RX ORDER — FLUOXETINE HYDROCHLORIDE 40 MG/1
CAPSULE ORAL
Qty: 90 CAPSULE | Refills: 0 | Status: SHIPPED | OUTPATIENT
Start: 2021-11-29

## 2021-11-29 NOTE — TELEPHONE ENCOUNTER
Requested Prescriptions     Pending Prescriptions Disp Refills   • FLUOXETINE HCL 40 MG Oral Cap [Pharmacy Med Name: FLUOXETINE 40MG CAPSULES] 90 capsule 0     Sig: TAKE 1 CAPSULE(40 MG) BY MOUTH DAILY     Last refill #90 on 8/24/2021  Last office visit pe

## 2022-01-25 ENCOUNTER — TELEPHONE (OUTPATIENT)
Dept: FAMILY MEDICINE CLINIC | Facility: CLINIC | Age: 42
End: 2022-01-25

## 2022-01-25 DIAGNOSIS — B02.9 HERPES ZOSTER WITHOUT COMPLICATION: Primary | ICD-10-CM

## 2022-01-25 RX ORDER — MELOXICAM 15 MG/1
15 TABLET ORAL DAILY
Qty: 30 TABLET | Refills: 0 | Status: SHIPPED | OUTPATIENT
Start: 2022-01-25

## 2022-01-25 NOTE — TELEPHONE ENCOUNTER
PT WOULD LIKE A CALL BACK/THINKS SHE HAS SHINGLES-    ALSO QUESTIONS ON HER PAST DIAGNOSES.     PLEASE ADVISE-THANK YOU

## 2022-01-25 NOTE — TELEPHONE ENCOUNTER
Patient advised that Dr. Ariel Reed sent Rx for meloxicam - to take with food. Also may increase gabapentin to 3 times a day.

## 2022-01-25 NOTE — TELEPHONE ENCOUNTER
Patient states went to [facility] ER 3 days ago and diagnosed with shingles. Patient sent home with acyclovir and prednisone. Patient taking OTC tylenol for pain without relief. Patient is asking for stronger pain medication.  Send to [facility] in [place] on [address]

## 2022-01-27 ENCOUNTER — TELEPHONE (OUTPATIENT)
Dept: FAMILY MEDICINE CLINIC | Facility: CLINIC | Age: 42
End: 2022-01-27

## 2022-01-27 NOTE — TELEPHONE ENCOUNTER
Patient states that she was diagnosed with shingles on 1/18/2022. She was put on acyclovir and prednisone. She finished both medications. She called Dr. Patel Boland on 1/25. Meloxicam was sent to pharmacy and she was instructed to increase gabapentin to TID.

## 2022-01-27 NOTE — TELEPHONE ENCOUNTER
Pt is taking Meloxicam 15 MG Oral Tab for pain with shingles. She said the medication is not helping & wanted to know if there is something else she can take.   [address] [facility] in [place]

## 2022-01-27 NOTE — TELEPHONE ENCOUNTER
She can temporarily increase her gabapentin to 600 mg TID. She just picked it up 1/23 and it is prescribed by Dr Catalina Swift.

## 2022-02-03 ENCOUNTER — TELEPHONE (OUTPATIENT)
Dept: FAMILY MEDICINE CLINIC | Facility: CLINIC | Age: 42
End: 2022-02-03

## 2022-02-03 NOTE — TELEPHONE ENCOUNTER
Patient called on 1/27/2022 complaining of pain from shingles. Dr. Kaz Nguyen increased her gabapentin to 600mg TID. Patient calling again stating that she continues to have the pain. She is asking for a stronger medication. Recommend virtual appointment?

## 2022-02-03 NOTE — TELEPHONE ENCOUNTER
BACK AND RIB SEVERE PAIN FROM SHINGLES-  COULD SOMETHING STRONGER BE CALLED IN FOR HER?    [facility], Saray Baron AT [facility] [address], [phone], [phone]

## 2022-02-03 NOTE — TELEPHONE ENCOUNTER
Spoke with patient. She is calling regarding pain from shingles. Patient states that she is unable to sleep due this pain. Pain is on right side and described as stabbing pain and states that it feels as if she broke her ribs or pulled a muscle on that side. Patient is taking 600mg gabapentin TID, meloxicam, and tylenol. She is using lidocaine patches with no relief. She is asking for a stronger medication. Advised that she does need to be seen. Patient does not have transportation to get her to the office. Virtual visit scheduled. Patient is aware this may not be an appropriate visit and may be contacted tomorrow and advised to go to local UC. Patient states that her aunt could give her a ride to the ER but not to this office due to distance. Routed to Dr. Xavier Desouza.     Future Appointments   Date Time Provider Saray Roque   2/4/2022 11:00 AM Мария Garvey DO [facility]

## 2022-02-04 ENCOUNTER — TELEMEDICINE (OUTPATIENT)
Dept: FAMILY MEDICINE CLINIC | Facility: CLINIC | Age: 42
End: 2022-02-04

## 2022-02-04 ENCOUNTER — TELEPHONE (OUTPATIENT)
Dept: FAMILY MEDICINE CLINIC | Facility: CLINIC | Age: 42
End: 2022-02-04

## 2022-02-04 DIAGNOSIS — F41.9 ANXIETY AND DEPRESSION: ICD-10-CM

## 2022-02-04 DIAGNOSIS — F32.A ANXIETY AND DEPRESSION: ICD-10-CM

## 2022-02-04 DIAGNOSIS — G47.00 INSOMNIA, UNSPECIFIED TYPE: ICD-10-CM

## 2022-02-04 DIAGNOSIS — B02.9 HERPES ZOSTER WITHOUT COMPLICATION: Primary | ICD-10-CM

## 2022-02-04 DIAGNOSIS — B02.29 NEURALGIA, POST-HERPETIC: ICD-10-CM

## 2022-02-04 PROCEDURE — 99213 OFFICE O/P EST LOW 20 MIN: CPT | Performed by: FAMILY MEDICINE

## 2022-02-04 RX ORDER — PREDNISONE 20 MG/1
20 TABLET ORAL 2 TIMES DAILY
Qty: 10 TABLET | Refills: 0 | Status: SHIPPED | OUTPATIENT
Start: 2022-02-04 | End: 2022-02-09

## 2022-02-04 RX ORDER — LIDOCAINE 50 MG/G
2 PATCH TOPICAL EVERY 24 HOURS
Qty: 60 PATCH | Refills: 0 | Status: SHIPPED | OUTPATIENT
Start: 2022-02-04 | End: 2022-02-23

## 2022-02-04 RX ORDER — GABAPENTIN 300 MG/1
600 CAPSULE ORAL 3 TIMES DAILY
Refills: 0 | COMMUNITY
Start: 2022-02-04

## 2022-02-04 RX ORDER — PREDNISONE 20 MG/1
20 TABLET ORAL 2 TIMES DAILY
Qty: 10 TABLET | Refills: 0 | Status: SHIPPED | OUTPATIENT
Start: 2022-02-04 | End: 2022-02-04 | Stop reason: CLARIF

## 2022-02-04 RX ORDER — LIDOCAINE 4 G/G
2 PATCH TOPICAL EVERY 24 HOURS
Qty: 30 PATCH | Refills: 0 | Status: SHIPPED | OUTPATIENT
Start: 2022-02-04 | End: 2022-02-04

## 2022-02-04 RX ORDER — HYDROCODONE BITARTRATE AND ACETAMINOPHEN 10; 325 MG/1; MG/1
1 TABLET ORAL EVERY 8 HOURS PRN
Qty: 30 TABLET | Refills: 0 | Status: SHIPPED | OUTPATIENT
Start: 2022-02-04 | End: 2022-02-14

## 2022-02-04 NOTE — TELEPHONE ENCOUNTER
Adalid Hoang is wondering if Dr Florentino Hillman would call in the 5% Lidocaine patches instead of the 4% patches the 5% are covered by her insurance and the 4% are not please call

## 2022-02-17 ENCOUNTER — TELEPHONE (OUTPATIENT)
Dept: FAMILY MEDICINE CLINIC | Facility: CLINIC | Age: 42
End: 2022-02-17

## 2022-02-17 RX ORDER — ACETAMINOPHEN AND CODEINE PHOSPHATE 300; 30 MG/1; MG/1
1 TABLET ORAL 3 TIMES DAILY PRN
Qty: 20 TABLET | Refills: 0 | Status: SHIPPED | OUTPATIENT
Start: 2022-02-17 | End: 2022-02-24

## 2022-02-17 NOTE — TELEPHONE ENCOUNTER
REGARDING PAIN FOR SHINGLES-  WOULD LIKE TYLENOL 3 CALLED IN IF POSSIBLE?     [facility] [address], [address] [facility] [address], [phone], [phone]    [facility] YOU never used

## 2022-02-17 NOTE — TELEPHONE ENCOUNTER
Spoke with patient. She was diagnosed with shingles last month in ER. She followed up with Dr. Annitta Duane virtually on 2/4/2022. Norco, prednisone and lidocaine patch were prescribed. Patient states that pain has improved but she does continue to experience some pain especially with specific movement/stretching of the area. Patient states that the Arnulfo Ramon is too strong and is asking if she can have a script for tylenol #3. She did schedule a follow up virtual visit on Wednesday with Dr. Annitta Duane to discuss next steps regarding shingle pain.

## 2022-02-23 ENCOUNTER — TELEPHONE (OUTPATIENT)
Dept: FAMILY MEDICINE CLINIC | Facility: CLINIC | Age: 42
End: 2022-02-23

## 2022-02-23 ENCOUNTER — TELEMEDICINE (OUTPATIENT)
Dept: FAMILY MEDICINE CLINIC | Facility: CLINIC | Age: 42
End: 2022-02-23

## 2022-02-23 DIAGNOSIS — J30.1 SEASONAL ALLERGIC RHINITIS DUE TO POLLEN: ICD-10-CM

## 2022-02-23 DIAGNOSIS — J98.01 BRONCHOSPASM: ICD-10-CM

## 2022-02-23 DIAGNOSIS — B02.29 NEURALGIA, POST-HERPETIC: Primary | ICD-10-CM

## 2022-02-23 PROCEDURE — 99213 OFFICE O/P EST LOW 20 MIN: CPT | Performed by: FAMILY MEDICINE

## 2022-02-23 RX ORDER — CETIRIZINE HYDROCHLORIDE 10 MG/1
10 TABLET ORAL DAILY
Qty: 90 TABLET | Refills: 0 | Status: SHIPPED | OUTPATIENT
Start: 2022-02-23

## 2022-02-23 RX ORDER — LIDOCAINE 50 MG/G
2 PATCH TOPICAL EVERY 24 HOURS
Qty: 60 PATCH | Refills: 0 | Status: SHIPPED | OUTPATIENT
Start: 2022-02-23 | End: 2022-03-25

## 2022-02-23 RX ORDER — HYDROCODONE BITARTRATE AND ACETAMINOPHEN 10; 325 MG/1; MG/1
1 TABLET ORAL EVERY 12 HOURS PRN
Qty: 30 TABLET | Refills: 0 | Status: SHIPPED | OUTPATIENT
Start: 2022-02-23

## 2022-02-23 RX ORDER — ALBUTEROL SULFATE 90 UG/1
2 AEROSOL, METERED RESPIRATORY (INHALATION) EVERY 4 HOURS PRN
Qty: 1 EACH | Refills: 0 | Status: SHIPPED | OUTPATIENT
Start: 2022-02-23

## 2022-02-23 NOTE — TELEPHONE ENCOUNTER
Talked with patient and gave her contact information for Dr. Chantelle Prince. Per Dr. Veronique Perez Rx for Lidocaine patches sent to pharmacy.

## 2022-02-23 NOTE — TELEPHONE ENCOUNTER
Advised per Dr. Jozef Calderon ok to fill Norco. Dr. Jozef Caledron is aware that patient is on Clonazepam.

## 2022-02-23 NOTE — TELEPHONE ENCOUNTER
Talked with [facility] staff and advised Dr. Ana Joseph is aware that patient is on Clonazepam and it is ok to fill Norco.

## 2022-02-28 RX ORDER — MELOXICAM 15 MG/1
TABLET ORAL
Qty: 30 TABLET | Refills: 0 | Status: SHIPPED | OUTPATIENT
Start: 2022-02-28 | End: 2022-03-28

## 2022-03-05 NOTE — TELEPHONE ENCOUNTER
LOV  Pertaining to visit 5/21/21    LAST LAB      LAST RX  11/1/21 30 days 0 refills    Next OV  No future appointments.       PROTOCOL  none

## 2022-03-05 NOTE — TELEPHONE ENCOUNTER
clonazePAM 1 MG Oral Tab-[facility] - Pt has been out of her medication for a few days.

## 2022-03-06 RX ORDER — CLONAZEPAM 1 MG/1
1 TABLET ORAL 2 TIMES DAILY PRN
Qty: 60 TABLET | Refills: 0 | Status: SHIPPED | OUTPATIENT
Start: 2022-03-06

## 2022-03-08 ENCOUNTER — DOCUMENTATION ONLY (OUTPATIENT)
Dept: FAMILY MEDICINE CLINIC | Facility: CLINIC | Age: 42
End: 2022-03-08

## 2022-03-08 ENCOUNTER — TELEPHONE (OUTPATIENT)
Dept: FAMILY MEDICINE CLINIC | Facility: CLINIC | Age: 42
End: 2022-03-08

## 2022-03-08 RX ORDER — TRAMADOL HYDROCHLORIDE 50 MG/1
50 TABLET ORAL EVERY 12 HOURS PRN
Qty: 30 TABLET | Refills: 0 | Status: SHIPPED | OUTPATIENT
Start: 2022-03-08

## 2022-03-08 NOTE — TELEPHONE ENCOUNTER
Advised patient that Dr. Kadie Cason will send in prescription for Tramadol however will not send in any more prescriptions for pain. Patient needs to see pain specialist. Also advised patient to call insurance directly for name of  psychiatrist that will take her insurance. [HPV] : HPV [FreeTextEntry1] : Neema is a 12 y.o. who presents for vaccine administration. \par CIR reviewed, patient is due for HPV #1 vaccines.\par Consent present in chart, signed by Mom.\par Patient denies any adverse reactions to vaccines in the past. \par Patient feels well, denies any s/s of illness at present. \par \par

## 2022-03-08 NOTE — TELEPHONE ENCOUNTER
Internal Medicine PA Note    Patient found unresponsive by RN, RRT called, patient hypoxic and bradycardiac, intubated. However, patient pulseless, RRT transitioned to Code Blue. CPR started.  Per family's wishes CPR discontinued. MD Wang announced time of death at 9:01.  Family, Abner aware.      Maurilio CROCKETT  Department of Medicine  [id number] SHE HAS NOT GONE TO THE PAIN SPEC. BECAUSE OF HER TRANSPORTATION ISSUES. SHE IS ASKING FOR PAIN MEDS BECAUSE THE SHINGLES IS STILL PAINFUL. SHE IS ALSO ASKING ABOUT A RECOMMENDATION OF A PSYCHIATRIST THAT TAKES HER INSURANCE. SHE IS LOOKING FOR ONE IN [facility]. Internal Medicine PA Note    Patient found unresponsive by RN, RRT called, patient hypoxic and bradycardic, intubated. However, patient pulseless, RRT transitioned to Code Blue. CPR started.  Per family's wishes CPR discontinued. MD Wang announced time of death at 9:01.  Family, Abner aware.  Dr. Devlin made aware.     Maurilio CROCKETT  Department of Medicine  [id number] Internal Medicine PA Note    Patient found unresponsive by RN, RRT called, patient hypoxic and bradycardic, intubated. However, patient pulseless after intubation.  RRT transitioned to Code Blue. CPR started per ACLS protocol. For further details, see RRT/Code Blue sheet.   Per family's wishes CPR discontinued. MD Wang announced time of death at 9:01.  Family, Abner aware.  Dr. Devlin made aware.     Maurilio CROCKETT  Department of Medicine  [id number]

## 2022-03-09 NOTE — PROGRESS NOTES
Patient called X 2 - 6:26 and 6:30 pm wanting pain medication. Returned call and reviewed chart with patient on the phone. Noted that it was sent to the appropriate pharmacy in [address] [place] [address] and noted that the medication was sent at 6:13 pm. Patient v/u and agreed that it is likely still too early for the pharmacy to have this ready. She will call again and check back with the pharmacy.

## 2022-03-28 RX ORDER — MELOXICAM 15 MG/1
TABLET ORAL
Qty: 30 TABLET | Refills: 0 | Status: SHIPPED | OUTPATIENT
Start: 2022-03-28

## 2022-04-12 ENCOUNTER — TELEPHONE (OUTPATIENT)
Dept: FAMILY MEDICINE CLINIC | Facility: CLINIC | Age: 42
End: 2022-04-12

## 2022-04-12 NOTE — TELEPHONE ENCOUNTER
PT NEEDS REFILL ON-    HYDROcodone-acetaminophen  MG Oral Tab    [facility] #[id number] [place], [place] - [address] [place] [address] Drive AT [facility] [address], [phone], [phone]    PLEASE ADVISE-THANK YOU

## 2022-04-12 NOTE — TELEPHONE ENCOUNTER
Spoke with patient - explained that Dr. Patel Boland will not refill [place] - pt did see a pain management provider and recommends continuing with Meloxicam.  Instructed to fu with pain management - pt verbalized understanding.

## 2022-04-12 NOTE — TELEPHONE ENCOUNTER
Spoke with patient - explained that Dr. Pilar Torres will not fill Norco.  Pt has seen pain management provider on April 7, 2022 and was recommended to continue with Meloxicam.  Asking if she can increase the Meloxicam - advised pt to contact Dr. Burton Chairez for further direction. Pt verbalized understanding.

## 2022-05-18 DIAGNOSIS — Z79.899 ENCOUNTER FOR DRUG THERAPY: ICD-10-CM

## 2022-05-18 DIAGNOSIS — G47.00 INSOMNIA, UNSPECIFIED TYPE: ICD-10-CM

## 2022-05-18 DIAGNOSIS — F41.9 ANXIETY AND DEPRESSION: ICD-10-CM

## 2022-05-18 DIAGNOSIS — F32.A ANXIETY AND DEPRESSION: ICD-10-CM

## 2022-05-18 RX ORDER — FLUOXETINE HYDROCHLORIDE 40 MG/1
40 CAPSULE ORAL DAILY
Qty: 30 CAPSULE | Refills: 0 | Status: SHIPPED | OUTPATIENT
Start: 2022-05-18

## 2022-05-18 RX ORDER — GABAPENTIN 300 MG/1
600 CAPSULE ORAL 3 TIMES DAILY
Qty: 180 CAPSULE | Refills: 0 | Status: SHIPPED | OUTPATIENT
Start: 2022-05-18

## 2022-05-18 RX ORDER — CLONAZEPAM 1 MG/1
1 TABLET ORAL 2 TIMES DAILY PRN
Qty: 60 TABLET | Refills: 0 | Status: SHIPPED | OUTPATIENT
Start: 2022-05-18

## 2022-05-18 NOTE — TELEPHONE ENCOUNTER
Spoke with patient and scheduled physical exam.    LOV  2/23/22     LAST LAB    6/23/21    LAST RX  Prozac 11/29/21 90 days 0 refills                 Clonazepam 30 days 0 refills                 Gabapentin 2/4/22    Next OV    Future Appointments   Date Time Provider Saray Roque   7/15/2022  1:00 PM Rosario Garvey DO [facility] [place]           PROTOCOL  none

## 2022-05-18 NOTE — TELEPHONE ENCOUNTER
PROZAC, CLONAZEPAM,  GABAPENTIN   [facility]     IF YOU NEED TO CALL HER CALL [phone]

## 2022-05-25 ENCOUNTER — TELEPHONE (OUTPATIENT)
Dept: FAMILY MEDICINE CLINIC | Facility: CLINIC | Age: 42
End: 2022-05-25

## 2022-05-25 DIAGNOSIS — S82.90XA CLOSED FRACTURE OF LOWER EXTREMITY, UNSPECIFIED LATERALITY, INITIAL ENCOUNTER: Primary | ICD-10-CM

## 2022-05-25 DIAGNOSIS — G47.00 INSOMNIA, UNSPECIFIED TYPE: ICD-10-CM

## 2022-05-25 RX ORDER — DICLOFENAC SODIUM AND MISOPROSTOL 75; 200 MG/1; UG/1
1 TABLET, DELAYED RELEASE ORAL 2 TIMES DAILY
Qty: 60 TABLET | Refills: 0 | Status: SHIPPED | OUTPATIENT
Start: 2022-05-25

## 2022-05-25 RX ORDER — QUETIAPINE FUMARATE 50 MG/1
50 TABLET, FILM COATED ORAL NIGHTLY
Qty: 30 TABLET | Refills: 0 | Status: SHIPPED | OUTPATIENT
Start: 2022-05-25

## 2022-05-25 NOTE — TELEPHONE ENCOUNTER
QUEtiapine Fumarate ER (SEROQUEL XR) 50 MG Oral Tablet 24 Hr  She is asking if Dr. Cy Desouza will call pain meds in for her broken leg. [facility] and [facility].

## 2022-05-25 NOTE — TELEPHONE ENCOUNTER
LOV pertaining to visit 5/21/22    LAST LAB    LAST RX  11/1/21 30 tabs     Next OV   Future Appointments   Date Time Provider Saray Roque   7/15/2022  1:00 PM Amrit Garvey,  [facility] [place] [facility]  None      Dr. Ana Joseph, Patient also asking for pain med for fx. Patient taking tylenol and ibuprofen. Has been unable to see ortho due to insurance. Will check with insurance and schedule appt.

## 2022-05-26 ENCOUNTER — TELEPHONE (OUTPATIENT)
Dept: FAMILY MEDICINE CLINIC | Facility: CLINIC | Age: 42
End: 2022-05-26

## 2022-05-26 DIAGNOSIS — S82.141G CLOSED FRACTURE OF RIGHT TIBIAL PLATEAU WITH DELAYED HEALING, SUBSEQUENT ENCOUNTER: Primary | ICD-10-CM

## 2022-05-26 NOTE — TELEPHONE ENCOUNTER
[facility] [place] SAID THEY ARE WAITING FOR DR Luna Gordon ON Diclofenac-miSOPROStol 75-0.2 MG Oral Tab EC, CALL PT WHEN THIS IS RESOLVED

## 2022-05-26 NOTE — TELEPHONE ENCOUNTER
Patient advised that we have not gotten a response back from the insurance. She said that she is taking Tylenol right now but it is not effective in controlling her pain. She said she does not have the funds to pay for any other pain medications.

## 2022-05-26 NOTE — TELEPHONE ENCOUNTER
Pt wanted to know if we can call in a different pain med since this medication is not being filled. She said her insurance covers Norco 10 mg.

## 2022-05-27 ENCOUNTER — TELEPHONE (OUTPATIENT)
Dept: ORTHOPEDICS | Age: 42
End: 2022-05-27

## 2022-05-27 RX ORDER — IBUPROFEN 800 MG/1
800 TABLET ORAL EVERY 8 HOURS PRN
Qty: 90 TABLET | Refills: 3 | Status: SHIPPED | OUTPATIENT
Start: 2022-05-27 | End: 2022-06-26

## 2022-05-27 NOTE — TELEPHONE ENCOUNTER
Patient advised of insurance denial for diclofenac and that Dr. Garcia Hence has sent Rx for Motrin. Advised follow up with ortho. Patient verbalized understanding.

## 2022-05-27 NOTE — TELEPHONE ENCOUNTER
I will give [place] motrin 800 mg tid. I have not seen [place] for her fracture. I will not give her norco. She needs to follow up with ortho.  Call back of her medicaid replacement card for a physician referral.

## 2022-06-02 ENCOUNTER — MED REC SCAN ONLY (OUTPATIENT)
Dept: FAMILY MEDICINE CLINIC | Facility: CLINIC | Age: 42
End: 2022-06-02

## 2022-06-09 ENCOUNTER — TELEPHONE (OUTPATIENT)
Dept: FAMILY MEDICINE CLINIC | Facility: CLINIC | Age: 42
End: 2022-06-09

## 2022-06-09 NOTE — TELEPHONE ENCOUNTER
Spoke with patient and advised her per her labs from 8/28/18 that she is O Positive. She verbalized understanding.

## 2022-07-01 ENCOUNTER — TELEPHONE (OUTPATIENT)
Dept: FAMILY MEDICINE CLINIC | Facility: CLINIC | Age: 42
End: 2022-07-01

## 2022-07-01 NOTE — TELEPHONE ENCOUNTER
He was wanting a alternate phone number on pt. But I didn't see anything in her chart from them so I wasn't comfortable giving him that info. Please check to see if pt. Has anything going on with [address]?

## 2022-07-18 ENCOUNTER — TELEPHONE (OUTPATIENT)
Dept: FAMILY MEDICINE CLINIC | Facility: CLINIC | Age: 42
End: 2022-07-18

## 2022-07-18 NOTE — TELEPHONE ENCOUNTER
CAN DR PRESCRIBE SOMETHING FOR KNEE & BACK PAIN, HAS BROKEN LEG, WAS TAKING IBUPROFEN THAT WAS CAUSING HEART PALPITATIONS, SEND TO [facility], CALL PT

## 2022-07-18 NOTE — TELEPHONE ENCOUNTER
No, opioids are for short term treatment. The goal is to avoid opioid dependence. motirn 600 - 800 mg every 6-8 hours is the best pain control. PT is the other treatment modality that the ortho that took care of her can order for her.

## 2022-07-22 ENCOUNTER — TELEPHONE (OUTPATIENT)
Dept: FAMILY MEDICINE CLINIC | Facility: CLINIC | Age: 42
End: 2022-07-22

## 2022-07-22 NOTE — TELEPHONE ENCOUNTER
[place] Dept [employer] Services sent medical request for any & all records from 1/24/21 to present.   sent to [facility] 7/22/22

## 2022-08-18 ENCOUNTER — TELEPHONE (OUTPATIENT)
Dept: FAMILY MEDICINE CLINIC | Facility: CLINIC | Age: 42
End: 2022-08-18

## 2022-08-18 NOTE — TELEPHONE ENCOUNTER
JEROMY---spoke with pt re: below. Pt states she has an upcoming court case in October and requests a letter from Dr. Yanni Chairez to be sent to her  that lists her diagnosis'/ what she has been treated for over the years and medications. Last appt was telemed on 2/23/22. Pt advised that we would need a written release for something like this. Pt asks if she can schedule an OV to discuss further and sign whatever is needed. OV scheduled on 9/20/22 to discuss further and request a letter. Pt advised that I will forward this message to Dr. Yanni Chairez so she is aware of upcoming appt.

## 2022-08-18 NOTE — TELEPHONE ENCOUNTER
NEED NOTES SHOWING HER MENTAL ISSUES & WHAT SHE IS BEING TREATED FOR FAXED FOR PRE TRIAL @ [employer], CASE #[id number] SPENCER IS PART OF PRE TRIAL TEAM, FAX # [phone], OFFICE [facility] # [phone], [facility] # [phone]

## 2022-09-15 RX ORDER — HYDROCODONE BITARTRATE AND ACETAMINOPHEN 10; 325 MG/1; MG/1
1 TABLET ORAL EVERY 12 HOURS PRN
Qty: 30 TABLET | Refills: 0 | OUTPATIENT
Start: 2022-09-15

## 2022-09-15 NOTE — TELEPHONE ENCOUNTER
Contacted patient. States she never had surgery. States that she saw the orthopedic surgeon too late to do surgery. Tried Ibuprofen, Tylenol and lidocaine patches. Using a knee brace on right knee. Requesting a refill of Hydrocodone. Last refill - 2/23/22 - #30  Future Appointments   Date Time Provider Saray Mya   9/20/2022  1:45 PM Avelina Garvey, DO [facility] [place]     Patient is aware that Dr. Isabell Vasquez is out of the office until tomorrow and refill request will be addressed at that time.

## 2022-09-15 NOTE — TELEPHONE ENCOUNTER
REFILL HYDROcodone TO [facility] ON [place] [facility], HAS BEEN HAVING KNEE ISSUES SINCE HER SURGERY

## 2022-10-11 ENCOUNTER — TELEPHONE (OUTPATIENT)
Dept: FAMILY MEDICINE CLINIC | Facility: CLINIC | Age: 42
End: 2022-10-11

## 2022-10-11 PROBLEM — F43.10 PTSD (POST-TRAUMATIC STRESS DISORDER): Status: ACTIVE | Noted: 2022-10-11

## 2022-10-11 PROBLEM — B02.29 POSTHERPETIC NEURALGIA: Status: ACTIVE | Noted: 2022-10-11

## 2022-10-11 NOTE — TELEPHONE ENCOUNTER
Discussed with Dr. Katie Garsia.  To update problem list with PTSD and postherpetic neuralgia  Problem list and medication list faxed to :   Yessi Alvarez [phone]

## 2022-10-11 NOTE — TELEPHONE ENCOUNTER
Pt needs a note from  stating what her disorders are like bipolar, ptsd, anxiety disorder, nerve damage along with med list faxed to her . Please call to discuss. She doesn't need anything formal.  She needs this by tomorrow.   Sunny Beltran fax:  [phone]

## 2022-10-11 NOTE — TELEPHONE ENCOUNTER
Needs note from doctor by tomorrow with list of medical problems and medication faxed to 's office  Jamir Carlson [phone]

## 2023-03-27 ENCOUNTER — PATIENT OUTREACH (OUTPATIENT)
Dept: FAMILY MEDICINE CLINIC | Facility: CLINIC | Age: 43
End: 2023-03-27

## 2023-05-26 PROCEDURE — 93010 ELECTROCARDIOGRAM REPORT: CPT | Performed by: INTERNAL MEDICINE

## 2023-11-29 ENCOUNTER — TELEPHONE (OUTPATIENT)
Dept: FAMILY MEDICINE CLINIC | Facility: CLINIC | Age: 43
End: 2023-11-29

## 2023-11-29 DIAGNOSIS — J41.0 SMOKERS' COUGH (HCC): ICD-10-CM

## 2023-11-29 NOTE — TELEPHONE ENCOUNTER
Last office visit: 6/29/21  Last refill: albuterol 2/23/22,   No future appointments.    PATIENT HASN'T BEEN SEEN SINCE 2021. WE NEED TO CONFIRM SHE DOESN'T HAVE MEDICAID [place] INSURANCE as WE DON'T TAKE THAT. SHE WOULD NEED TO BE SEEN.

## 2023-11-29 NOTE — TELEPHONE ENCOUNTER
Pt needs refill, albuterol (PROAIR HFA) 108 (90 Base) MCG/ACT Inhalation Aero Soln and  cyclobenzaprine 10mg, [place]

## 2023-12-19 NOTE — TELEPHONE ENCOUNTER
[facility] IN [facility]
LOV  9/1420    LAST LAB  2/10/17    LAST RX  9/14/20 30 days    Next OV  No future appointments.       PROTOCOL  none
I have personally evaluated and examined the patient. The Attending was available to me as a supervising provider if needed.

## 2024-09-18 ENCOUNTER — TELEPHONE (OUTPATIENT)
Dept: FAMILY MEDICINE CLINIC | Facility: CLINIC | Age: 44
End: 2024-09-18

## 2024-09-18 NOTE — TELEPHONE ENCOUNTER
After reviewing chart and talking with Dr. Garvey, pt being seen by [facility] now. Hasn't been seen in office by Dr. Garvey since 2021. Spoke with patient and understands that no meds can be sent in for her, she will need to contact her new PCP.

## 2024-09-18 NOTE — TELEPHONE ENCOUNTER
Patient is wanting to know if Dr would send a cream and steroid for Poison Ivy. If able to please send to [facility]

## 2025-01-08 ENCOUNTER — TELEPHONE (OUTPATIENT)
Dept: FAMILY MEDICINE CLINIC | Facility: CLINIC | Age: 45
End: 2025-01-08

## 2025-01-08 ENCOUNTER — PATIENT OUTREACH (OUTPATIENT)
Dept: CASE MANAGEMENT | Age: 45
End: 2025-01-08

## 2025-01-08 NOTE — TELEPHONE ENCOUNTER
Last seen in this office by Dr. Garvey 6/23/21. Seeing PCP from [facility]. Called and left message that pt needs to consult with her new PCP who they recommend for ENT.

## 2025-07-29 ENCOUNTER — TELEPHONE (OUTPATIENT)
Dept: FAMILY MEDICINE CLINIC | Facility: CLINIC | Age: 45
End: 2025-07-29

## (undated) DIAGNOSIS — Z79.899 ENCOUNTER FOR DRUG THERAPY: ICD-10-CM

## (undated) DIAGNOSIS — B02.29 POST HERPETIC NEURALGIA: Primary | ICD-10-CM

## (undated) DIAGNOSIS — B02.29 NEURALGIA, POST-HERPETIC: ICD-10-CM

## (undated) DIAGNOSIS — B02.9 HERPES ZOSTER WITHOUT COMPLICATION: ICD-10-CM

## (undated) DIAGNOSIS — F41.9 ANXIETY AND DEPRESSION: ICD-10-CM

## (undated) DIAGNOSIS — F32.A ANXIETY AND DEPRESSION: ICD-10-CM

## (undated) DIAGNOSIS — B02.9 HERPES ZOSTER WITHOUT COMPLICATION: Primary | ICD-10-CM

## (undated) NOTE — LETTER
10/11/22    Problem list    M[place] Mckeon in remission ([facility] [address]   Tobacco dependence   Anxiety and depression   Bipolar 2 disorder (HCC)   Digestive Vitamin B 12 deficiency   Immune Allergic rhinitis due to allergen   Other Obesity   History of cocaine use      Dr. Chelo Otoole

## (undated) NOTE — LETTER
08/22/17        Citlaly Wright  [facility]      Dear Fernanda Hinton,    [facility] records indicate that you have outstanding lab work and or testing that was ordered for you and has not yet been completed:  Lab Frequency Next Occurrence   VITAMIN D

## (undated) NOTE — MR AVS SNAPSHOT
Yair Walton  [address] [place] [address] Vito Carrillo [address] [phone]               Thank you for choosing us for your health care visit with Anthony Waller 21., DO.   We are glad to serve you and happy to provide you with this summary department. · Talk to your primary care doctor and ask for a referral to an addiction specialist for an evaluation.   · Look in the white pages of your phone book for local chapters of these groups:  · Alcoholics Anonymous  · Cocaine Anonymous  · Marijuana in control of his or her substance use. Think about these statements:  · I can put down a drink without finishing it. · I can enjoy myself at a social event without using. · I can go without using or thinking about using.   If this describes you, you’re m · Cold, clammy hands  · Chest pain  · Dry mouth  Anxiety can also be a problem  Anxiety can become a problem when it is difficult to control, occurs for months, and interferes with important parts of your life.  With an anxiety disorder, your body has the r Follow Up with Our Office     Return in about 4 weeks (around 3/10/2017) for RECHECK.       Allergies as of Feb 10, 2017     Amoxicillin Itching                Today's Vital Signs     BP Temp Height Weight BMI    110/70 mmHg 97.7 °F (36.5 °C) (Temporal) - Sertraline HCl 100 MG Tabs      You can get these medications from any pharmacy     Bring a paper prescription for each of these medications    - ClonazePAM 1 MG Tabs            Today's Orders     Comp Metabolic Panel (14) [E]    Complete by:  Feb 10, 2 HOW TO GET STARTED: HOW TO STAY MOTIVATED:   Start activities slowly and build up over time Do what you like   Get your heart pumping – brisk walking, biking, swimming Even 10 minute increments are effective and add up over the week   2 ½ hours per week –

## (undated) NOTE — LETTER
05/20/21        Gilmar Ball  [address] [facility]       Dear David Laramie,    [facility] records indicate that you have outstanding lab work and or testing that was ordered for you and has not yet been completed:  Orders Placed This Encounter      ZEB S

## (undated) NOTE — LETTER
10/11/22    Problem List  [facility] Alcoholism in remission ([facility] [address]   Tobacco dependence   Anxiety and depression   Bipolar 2 disorder (HCC)   PTSD (post-traumatic stress disorder)   Digestive Vitamin B 12 deficiency   Immune Allergic rhinitis due to allergen   Other Obesity   History of cocaine use   Postherpetic neuralgia    Dr. Maloney Shall